# Patient Record
Sex: MALE | Race: WHITE | NOT HISPANIC OR LATINO | Employment: UNEMPLOYED | URBAN - METROPOLITAN AREA
[De-identification: names, ages, dates, MRNs, and addresses within clinical notes are randomized per-mention and may not be internally consistent; named-entity substitution may affect disease eponyms.]

---

## 2024-04-12 ENCOUNTER — HOSPITAL ENCOUNTER (OUTPATIENT)
Dept: PSYCHIATRY | Facility: HOSPITAL | Age: 24
Discharge: HOME | End: 2024-04-12
Attending: PSYCHIATRY & NEUROLOGY
Payer: COMMERCIAL

## 2024-04-12 VITALS
DIASTOLIC BLOOD PRESSURE: 57 MMHG | RESPIRATION RATE: 18 BRPM | TEMPERATURE: 97 F | HEART RATE: 88 BPM | SYSTOLIC BLOOD PRESSURE: 111 MMHG

## 2024-04-12 DIAGNOSIS — F40.10 SOCIAL ANXIETY DISORDER: ICD-10-CM

## 2024-04-12 DIAGNOSIS — F33.2 SEVERE EPISODE OF RECURRENT MAJOR DEPRESSIVE DISORDER, WITHOUT PSYCHOTIC FEATURES: Primary | ICD-10-CM

## 2024-04-12 DIAGNOSIS — F41.1 GAD (GENERALIZED ANXIETY DISORDER): ICD-10-CM

## 2024-04-12 PROCEDURE — 90853 GROUP PSYCHOTHERAPY: CPT

## 2024-04-12 PROCEDURE — 90792 PSYCH DIAG EVAL W/MED SRVCS: CPT | Mod: ,,, | Performed by: PSYCHIATRY & NEUROLOGY

## 2024-04-12 RX ORDER — ESCITALOPRAM OXALATE 5 MG/1
5 TABLET ORAL NIGHTLY
Qty: 30 TABLET | Refills: 0 | Status: SHIPPED | OUTPATIENT
Start: 2024-04-12 | End: 2024-06-11
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
## 2024-04-12 NOTE — PROGRESS NOTES
Group Psychotherapy (PhD/LCSW)    Site: [facility]    Clinical status of patient: Intensive Outpatient Program (IOP)    Date: 4/12/2024    Group Focus: Psychodynamic Group Psychotherapy    Length of service: 95652 - 45-50 minutes    Number of patients in attendance: 7    Referred by: [facility]     Target symptoms: Mood Disorder    Patient's response to treatment: Active Listening, Self-disclosure, Frequent Questions, and Feedback given to another patient    Progress toward goals: Progressing adequately    Interval History: Patient introduced themselves to the group and encouraged the other members who were making contributions to continue to try and utilize their newly learned skills. Patient validated the comments of other group members and offered insights and shared experiences of their own. Patient participated in group discussion of destigmatizing sleeping in separate beds for the health of the relationship, and the challenges of communicating that need to a partner.     Diagnosis:     ICD-10-CM ICD-9-CM   1. Severe episode of recurrent major depressive disorder, without psychotic features  F33.2 296.33       Plan: Continue treatment on [facility]

## 2024-04-12 NOTE — PLAN OF CARE
04/12/24 1508   Activity/Group Therapy Checklist   Group Meditation/Relaxation   Attendance Attended   Follows Direction Followed directions   Group Interactions/Observations Interacted appropriately;Sharing;Supportive;Alert   Affect/Mood Range Normal range   Affect/Mood Display Appropriate   Goal Progression Progressing

## 2024-04-12 NOTE — TREATMENT PLAN
"[facility]  MENTAL WELLNESS PROGRAM  INTERDISCIPLINARY TREATMENT PLAN  INTENSIVE OUTPATIENT     INTERDISCIPLINARY  TREATMENT TEAM:    Jonathan Nash M.D., Psychiatrist    Chata Pacheco M.D., Psychiatrist      Luna Alvarado, Ph.D., Clinical Psychologist    Saad Augustine LPN, Licensed Practical Nurse    Loreto Wilkins, GLORIASW, Licensed Master of Social Work     MELIA Hand, Registered     Resident: Romain Overton MD    (Signatures scanned into record separately).      ESTIMATED LOS:  2 weeks    The patient has reviewed the treatment plan with staff and has signed the "Patient Responsibilities" form.    (Patient signature scanned into record separately).       TREATMENT PLAN    DIAGNOSIS:  MDD, recurrent, severe  SANGEETHA  Social Anxiety Disorder  R/o ASD    Patient Education Needs/Barriers to Learning (i.e., Language, Reading, Comprehension): None     Support/Advocacy Services/Needs (i.e., Financial, Transportation, Medications): None     Community Resources (i.e., Alcoholics Anonymous, Al Anon): None     Patients Identified Goals:  Have a better tool set to handle and process my emotions   Change behaviors I find self-harmful     Coping Skills:  Aggressive hobbying, shift focus to enjoyment whether priority or not   Self medication       Strengths:  When given a task, I will complete it, no excuses   Intelligent     Limitations:  Bad emotional identification/handling methods   Despair, feeling that I am unworth the effort in changing     Goals and Objectives:  1. Goal: Attend and participate in all groups   Objective measure: Progress notes indicating active listening,    self-disclosure, feedback   Time frame to reach goal: Each day    2. Goal: Medication management   Objective measure: Physician progress note indicating improved medication status   Time frame to reach goal: By discharge    3. Goal: Reduce depression   Objective measure: Physician progress note indicating " depression is improved   Time frame to reach goal: By discharge    4.  Goal: Reduce anxiety   Objective measure: Physician progress note indicating anxiety is improved   Time frame to reach goal: By discharge    5. Goal: Develop stress coping skills   Objective measure: Patient self-report of improved coping   Time frame to reach goal: By discharge      Group Interventions:    Psychodynamic Group Psychotherapy - 1 hour, 5 times per week  Goals: 1. Utilize group empathy and support for problem solving; 2. Apply stress management, communication, and assertiveness skills to personal issues; 3. Discuss mental health symptoms and explore strategies for coping; 4. Discuss ways to change lifestyle to maintain better emotional health.    Communication Skills - 1 hour, 2 times per week  Goals: 1. Learn rules of effective communication; 2. Improve listening skills; 3. Practice clear communication.    Nutrition and Health - 1 hour, 1 time per week  Goals: 1. Explore impact that nutrition has on health; 2. Identify positive nutritional choices; 3. Explore how nutrition relates to stress tolerance.    Personal Growth - 1 hour, 2 times per week  Goals: 1. Discuss the development of self; 2. Create personal awareness and insight; 3. Explore a variety of psycho-educational techniques.    Promoting Healthy Lifestyles - 1 hour, 1 time per week  Goals: 1. Understand the Biopsychosocial Model of Health; 2. Develop insight into how mental health can impact other dimensions of health; 3. Develop appropriate health promotion strategies.    Acceptance and Commitment Therapy (ACT)- 1 hour, 1 time per week  Goals: 1. Learn an action-oriented psychotherapy called ACT; 2. Focus on identifying, challenging, and clarifying values systems and beliefs; 3. Explore how values oriented actions can lead to emotional well-being.    Relationship Dynamics - 1 hour, 1 time per week  Goals: 1. Learn about factors that shape relationships; 2. Understand the  central role of relationships in personal well-being; 3. Learn how to improve all relationships.    Relaxation Training - 1 hour, 3 times per week  Goals: 1. Learn about and implement various techniques for releasing physical tension from the body.    Spirituality - 1 hour, 1 time per week  Goals: 1. Discuss and reflect on the process of seeking peace and comfort; 2. Identify healthy ways of exploring spirituality.    Stress Management - 1 hour, 4 times per week  Goals: 1. Identify types and levels of stress; 2. Identify and change maladaptive beliefs and behaviors; 3. Identify and practice techniques of stress management.    DBT- 1 hour, 4 times per week  Goals: 1. Discuss emotion regulation and Interpersonal Effectiveness Skills and practice mindfulness; 2. Identify and change maladaptive beliefs and behaviors; Identify and practice techniques of DBT and mindfulness.

## 2024-04-12 NOTE — PLAN OF CARE
"   04/12/24 1532   Activity/Group Therapy Checklist   Group Other (Comments)  (Creative Therapy)   Attendance Attended   Follows Direction Followed directions   Group Interactions/Observations Interacted appropriately;Alert;Sharing;Supportive   Affect/Mood Range Normal range   Affect/Mood Display Appropriate   Goal Progression Progressing      facilitated creative session and dicussed with patients how creative therapy can be therapeutic to resolving anxiety and other stress-related issues. SW discussed activity: " Mask On, Mask Off". Patient's were able to draw and/ or color two blank masks and depict two versions of self. 1) Of how others perceive them and 2) of how they perceive themselves. SW discussed with pts how this was a form of self expression and an opportunity to use this creative as a way to have difficult conversations.   "

## 2024-04-15 ENCOUNTER — HOSPITAL ENCOUNTER (OUTPATIENT)
Dept: PSYCHIATRY | Facility: HOSPITAL | Age: 24
Discharge: HOME | End: 2024-04-15
Attending: STUDENT IN AN ORGANIZED HEALTH CARE EDUCATION/TRAINING PROGRAM
Payer: COMMERCIAL

## 2024-04-15 DIAGNOSIS — F33.2 SEVERE EPISODE OF RECURRENT MAJOR DEPRESSIVE DISORDER, WITHOUT PSYCHOTIC FEATURES: Primary | ICD-10-CM

## 2024-04-15 PROCEDURE — 90853 GROUP PSYCHOTHERAPY: CPT

## 2024-04-15 PROCEDURE — 99232 SBSQ HOSP IP/OBS MODERATE 35: CPT | Mod: GT,,, | Performed by: STUDENT IN AN ORGANIZED HEALTH CARE EDUCATION/TRAINING PROGRAM

## 2024-04-15 NOTE — PLAN OF CARE
04/15/24 1515   Activity/Group Therapy Checklist   Group Other (Comments)  (Processing)   Attendance Attended   Follows Direction Followed directions   Group Interactions/Observations Interacted appropriately;Sharing;Supportive;Alert   Affect/Mood Range Normal range   Affect/Mood Display Appropriate   Goal Progression Progressing

## 2024-04-15 NOTE — PROGRESS NOTES
Group Psychotherapy (PhD/LCSW)    Site: [facility]    Clinical status of patient: Intensive Outpatient Program (IOP)    Date: 4/15/2024    Group Focus: Distress Tolerance    Length of service: 55091 - 45-50 minutes    Number of patients in attendance: 10    Referred by: [facility]     Target symptoms: Mood Disorder    Patient's response to treatment: Active Listening, and Self-disclosure,    Progress toward goals: Progressing adequately    Interval History: Session focus was Distress Tolerance:  TIP skill.  Patients were encouraged to use temperature, intense exercise, paced breathing, or paired muscle relaxation to reduce intensity of distress.     Diagnosis:     ICD-10-CM ICD-9-CM   1. Severe episode of recurrent major depressive disorder, without psychotic features  F33.2 296.33       Plan: Continue treatment on [facility]

## 2024-04-15 NOTE — PROGRESS NOTES
"[facility] PROGRESS NOTE    PATIENT NAME: Shabbir Alicea  MRN: [id number]  ENCOUNTER DATE:  4/15/2024 9:36 AM   SITE:  U unit, [facility]  ADMIT DATE:   Pt Name: Shabbir Alicea   : 2000   MRN: [id number]      HISTORY OF PRESENTING ILLNESS:  Shabbir Alicea is a 24 y.o. male with history of no prior psychiatric diagnosis who is admitted to BMU for worsening depression and anxiety.     Pt describes lots of stressors, including in relationship. Very poor coping skills. Girlfriend has serious mental illness. Mom is a stressor also.  He was rx'd lexapro 5 mg but he did not pick it up. He is open to starting a medication.   Pt lives with girlfriend. 1 year anniversary with girlfriend. Overall he feels they have an OK relationship.   He would like to work on his coping skills  No SI, HI, AVH.       Risk Parameters:  Patient reports no suicidal ideation  Patient reports no homicidal ideation  Patient reports no self-injurious behavior  Patient reports no violent behavior    Current psych meds  Lexapro 5 mg        PAST PSYCHIATRIC, MEDICAL, AND SOCIAL HISTORY REVIEWED  The patient's past medical, family and social history have been reviewed and updated as appropriate within the electronic medical record     MEDICAL REVIEW OF SYSTEMS  History obtained from the patient  General : NO chills or fever  Respiratory: NO cough, shortness of breath  Cardiovascular: NO chest pain, palpitations or racing heart  Gastrointestinal: NO nausea, vomiting, constipation or diarrhea  Neurological: NO confusion, dizziness, headaches or tremors  Psychiatric: please see HPI     ALL MEDICATIONS:    Current Outpatient Medications:     EScitalopram oxalate (LEXAPRO) 5 MG Tab, Take 1 tablet (5 mg total) by mouth nightly., Disp: 30 tablet, Rfl: 0    ALLERGIES:  Review of patient's allergies indicates:  Not on File    RELEVANT LABS/STUDIES:    No results found for: "WBC", "HGB", "HCT", "MCV", "PLT"  BMP  No " "results found for: "NA", "K", "CL", "CO2", "BUN", "CREATININE", "CALCIUM", "ANIONGAP", "ESTGFRAFRICA", "EGFRNONAA"  No results found for: "ALT", "AST", "GGT", "ALKPHOS", "BILITOT"  No results found for: "TSH"  No results found for: "LABA1C", "HGBA1C"    VITALS  defer to nursing note    PHYSICAL EXAM  General: well developed, well nourished  Neurologic:   Gait: Normal   Psychomotor signs:  No involuntary movements or tremor  AIMS: none    PSYCHIATRIC EXAM:  Mental Status Exam:  General Appearance: appears stated age, well developed and nourished, adequately groomed and appropriately dressed, in no acute distress  Behavior: cooperative, friendly, pleasant, polite, poor eye contact  Involuntary Movements and Motor Activity: no abnormal involuntary movements noted; no tics, no tremors, no akathisia, no dystonia, no evidence of tardive dyskinesia; no psychomotor agitation or retardation  Gait and Station: intact, normal gait and station, ambulates without assistance  Speech and Language: normal rate, rhythm, volume, tone, and pitch  Mood: "anxious"  Affect: reactive, mood-congruent, anxious  Thought Process and Associations: intact; linear, goal-directed, organized, and logical; no loosening of associations noted  Thought Content and Perceptions:: no suicidal or homicidal ideation, no auditory or visual hallucinations, no paranoid ideation, no ideas of reference, no evidence of delusions or psychosis  Sensorium and Orientation: intact; alert with clear sensorium; oriented fully to person, place, time and situation  Recent and Remote Memory: grossly intact, able to recall relevant and salient information from the recent and remote past  Attention and Concentration: grossly intact, attentive to the conversation and not readily distractible  Fund of Knowledge: grossly intact, used appropriate vocabulary and demonstrated an awareness of current events, consistent with educational level achieved  Insight: intact, demonstrates " awareness of illness and situation  Judgment: intact, behavior is adequate/appropriate to the circumstances, compliant with health provider's recommendations and instructions      IMPRESSION:    Shabbir Alicea is a 24 y.o. male with history of MDD, recurent, severe, SANGEETHA, SAD, r/o ASD who is admitted to BMU for treatment.     Status/Progress:  Based on the examination today, the patient's problem(s) is/are inadequately controlled.  New problems have not been presented today.     DIAGNOSES:  MDD, recurrent, severe  SANGEETHA  Social Anxiety Disorder  R/o ASD    PLAN:    1) Continue BMU program with group and individual therapies.     2) Psych Med:  Start lexapro 5 mg. Reviewed r/b/SE with the patient. Advised him to pick the medication up and get labwork- he did not do this yet.     Discussed with patient informed consent, risks vs. benefits, alternative treatments, side effect profile and the inherent unpredictability of individual responses to these treatments. Answered any questions patient may have had. The patient expresses understanding of the above and displays the capacity to agree with this current plan     3) Other: Labs/medical problems/ collateral- none needed

## 2024-04-16 ENCOUNTER — HOSPITAL ENCOUNTER (OUTPATIENT)
Dept: PSYCHIATRY | Facility: HOSPITAL | Age: 24
Discharge: HOME | End: 2024-04-16
Attending: STUDENT IN AN ORGANIZED HEALTH CARE EDUCATION/TRAINING PROGRAM
Payer: COMMERCIAL

## 2024-04-16 ENCOUNTER — LAB VISIT (OUTPATIENT)
Dept: LAB | Facility: HOSPITAL | Age: 24
End: 2024-04-16
Payer: COMMERCIAL

## 2024-04-16 DIAGNOSIS — F33.2 SEVERE EPISODE OF RECURRENT MAJOR DEPRESSIVE DISORDER, WITHOUT PSYCHOTIC FEATURES: ICD-10-CM

## 2024-04-16 DIAGNOSIS — F33.2 SEVERE EPISODE OF RECURRENT MAJOR DEPRESSIVE DISORDER, WITHOUT PSYCHOTIC FEATURES: Primary | ICD-10-CM

## 2024-04-16 LAB
ALBUMIN SERPL BCP-MCNC: 3.9 G/DL (ref 3.5–5.2)
ALP SERPL-CCNC: 66 U/L (ref 55–135)
ALT SERPL W/O P-5'-P-CCNC: 13 U/L (ref 10–44)
ANION GAP SERPL CALC-SCNC: 8 MMOL/L (ref 8–16)
AST SERPL-CCNC: 15 U/L (ref 10–40)
BASOPHILS # BLD AUTO: 0.05 K/UL (ref 0–0.2)
BASOPHILS NFR BLD: 0.8 % (ref 0–1.9)
BILIRUB SERPL-MCNC: 0.3 MG/DL (ref 0.1–1)
BUN SERPL-MCNC: 12 MG/DL (ref 6–20)
CALCIUM SERPL-MCNC: 9.3 MG/DL (ref 8.7–10.5)
CHLORIDE SERPL-SCNC: 103 MMOL/L (ref 95–110)
CO2 SERPL-SCNC: 31 MMOL/L (ref 23–29)
CREAT SERPL-MCNC: 0.8 MG/DL (ref 0.5–1.4)
DIFFERENTIAL METHOD BLD: ABNORMAL
EOSINOPHIL # BLD AUTO: 0.4 K/UL (ref 0–0.5)
EOSINOPHIL NFR BLD: 6.8 % (ref 0–8)
ERYTHROCYTE [DISTWIDTH] IN BLOOD BY AUTOMATED COUNT: 13.3 % (ref 11.5–14.5)
EST. GFR  (NO RACE VARIABLE): >60 ML/MIN/1.73 M^2
GLUCOSE SERPL-MCNC: 92 MG/DL (ref 70–110)
HCT VFR BLD AUTO: 40.7 % (ref 40–54)
HGB BLD-MCNC: 13.4 G/DL (ref 14–18)
IMM GRANULOCYTES # BLD AUTO: 0.01 K/UL (ref 0–0.04)
IMM GRANULOCYTES NFR BLD AUTO: 0.2 % (ref 0–0.5)
LYMPHOCYTES # BLD AUTO: 1.5 K/UL (ref 1–4.8)
LYMPHOCYTES NFR BLD: 23.9 % (ref 18–48)
MCH RBC QN AUTO: 30.5 PG (ref 27–31)
MCHC RBC AUTO-ENTMCNC: 32.9 G/DL (ref 32–36)
MCV RBC AUTO: 93 FL (ref 82–98)
MONOCYTES # BLD AUTO: 0.6 K/UL (ref 0.3–1)
MONOCYTES NFR BLD: 9 % (ref 4–15)
NEUTROPHILS # BLD AUTO: 3.8 K/UL (ref 1.8–7.7)
NEUTROPHILS NFR BLD: 59.3 % (ref 38–73)
NRBC BLD-RTO: 0 /100 WBC
PLATELET # BLD AUTO: 254 K/UL (ref 150–450)
PMV BLD AUTO: 9.9 FL (ref 9.2–12.9)
POTASSIUM SERPL-SCNC: 4 MMOL/L (ref 3.5–5.1)
PROT SERPL-MCNC: 6.9 G/DL (ref 6–8.4)
RBC # BLD AUTO: 4.39 M/UL (ref 4.6–6.2)
SODIUM SERPL-SCNC: 142 MMOL/L (ref 136–145)
TSH SERPL DL<=0.005 MIU/L-ACNC: 0.66 UIU/ML (ref 0.4–4)
WBC # BLD AUTO: 6.44 K/UL (ref 3.9–12.7)

## 2024-04-16 PROCEDURE — 36415 COLL VENOUS BLD VENIPUNCTURE: CPT | Performed by: STUDENT IN AN ORGANIZED HEALTH CARE EDUCATION/TRAINING PROGRAM

## 2024-04-16 PROCEDURE — 90853 GROUP PSYCHOTHERAPY: CPT | Mod: ,,, | Performed by: PSYCHOLOGIST

## 2024-04-16 PROCEDURE — 80053 COMPREHEN METABOLIC PANEL: CPT | Performed by: STUDENT IN AN ORGANIZED HEALTH CARE EDUCATION/TRAINING PROGRAM

## 2024-04-16 PROCEDURE — 99232 SBSQ HOSP IP/OBS MODERATE 35: CPT | Mod: ,,, | Performed by: PSYCHIATRY & NEUROLOGY

## 2024-04-16 PROCEDURE — 85025 COMPLETE CBC W/AUTO DIFF WBC: CPT | Performed by: STUDENT IN AN ORGANIZED HEALTH CARE EDUCATION/TRAINING PROGRAM

## 2024-04-16 PROCEDURE — 90853 GROUP PSYCHOTHERAPY: CPT

## 2024-04-16 PROCEDURE — 84443 ASSAY THYROID STIM HORMONE: CPT | Performed by: STUDENT IN AN ORGANIZED HEALTH CARE EDUCATION/TRAINING PROGRAM

## 2024-04-16 NOTE — PROGRESS NOTES
"[facility] PROGRESS NOTE    PATIENT NAME: Shabbir Alicea  MRN: [id number]  ENCOUNTER DATE:  2024 9:36 AM   SITE:  U unit, [facility]  ADMIT DATE:   Pt Name: Shabbir Alicea   : 2000   MRN: [id number]      HISTORY OF PRESENTING ILLNESS:  Shabbir Alicea is a 24 y.o. male with history of no prior psychiatric diagnosis who is admitted to BMU for worsening depression and anxiety.     Pt states program is going great and feeling very hopeful with all the coping mechanisms. Feels that boundary setting skills likely to be most helpful. Started Lexapro yesterday and denies any side effects. Sleep and appetite are "OK". Denies SI, NSSIB, HI. No s/sx diane and/or psychosis.       Risk Parameters:  Patient reports no suicidal ideation  Patient reports no homicidal ideation  Patient reports no self-injurious behavior  Patient reports no violent behavior    Current psych meds  Lexapro 5 mg        PAST PSYCHIATRIC, MEDICAL, AND SOCIAL HISTORY REVIEWED  The patient's past medical, family and social history have been reviewed and updated as appropriate within the electronic medical record     MEDICAL REVIEW OF SYSTEMS  History obtained from the patient  General : NO chills or fever  Respiratory: NO cough, shortness of breath  Cardiovascular: NO chest pain, palpitations or racing heart  Gastrointestinal: NO nausea, vomiting, constipation or diarrhea  Neurological: NO confusion, dizziness, headaches or tremors  Psychiatric: please see HPI     ALL MEDICATIONS:    Current Outpatient Medications:     EScitalopram oxalate (LEXAPRO) 5 MG Tab, Take 1 tablet (5 mg total) by mouth nightly., Disp: 30 tablet, Rfl: 0    ALLERGIES:  Review of patient's allergies indicates:  Not on File    RELEVANT LABS/STUDIES:    No results found for: "WBC", "HGB", "HCT", "MCV", "PLT"  BMP  No results found for: "NA", "K", "CL", "CO2", "BUN", "CREATININE", "CALCIUM", "ANIONGAP", "ESTGFRAFRICA", "EGFRNONAA"  No results " "found for: "ALT", "AST", "GGT", "ALKPHOS", "BILITOT"  No results found for: "TSH"  No results found for: "LABA1C", "HGBA1C"    VITALS  defer to nursing note    PHYSICAL EXAM  General: well developed, well nourished  Neurologic:   Gait: Normal   Psychomotor signs:  No involuntary movements or tremor  AIMS: none    PSYCHIATRIC EXAM:  Mental Status Exam:  General Appearance: appears stated age, well developed and nourished, adequately groomed and appropriately dressed, in no acute distress  Behavior: cooperative, friendly, pleasant, polite, poor eye contact  Involuntary Movements and Motor Activity: no abnormal involuntary movements noted; no tics, no tremors, no akathisia, no dystonia, no evidence of tardive dyskinesia; no psychomotor agitation or retardation  Gait and Station: intact, normal gait and station, ambulates without assistance  Speech and Language: normal rate, rhythm, volume, tone, and pitch  Mood: "good"  Affect: reactive, mood-congruent, anxious  Thought Process and Associations: intact; linear, goal-directed, organized, and logical; no loosening of associations noted  Thought Content and Perceptions:: no suicidal or homicidal ideation, no auditory or visual hallucinations, no paranoid ideation, no ideas of reference, no evidence of delusions or psychosis  Sensorium and Orientation: intact; alert with clear sensorium; oriented fully to person, place, time and situation  Recent and Remote Memory: grossly intact, able to recall relevant and salient information from the recent and remote past  Attention and Concentration: grossly intact, attentive to the conversation and not readily distractible  Fund of Knowledge: grossly intact, used appropriate vocabulary and demonstrated an awareness of current events, consistent with educational level achieved  Insight: intact, demonstrates awareness of illness and situation  Judgment: intact, behavior is adequate/appropriate to the circumstances, compliant with " health provider's recommendations and instructions      IMPRESSION:    Shabbir Alicea is a 24 y.o. male with history of MDD, recurent, severe, SANGEETHA, SAD, r/o ASD who is admitted to BMU for treatment.     Status/Progress:  Based on the examination today, the patient's problem(s) is/are inadequately controlled.  New problems have not been presented today.     DIAGNOSES:  MDD, recurrent, severe  SANGEETHA  Social Anxiety Disorder  R/o ASD    PLAN:    1) Continue BMU program with group and individual therapies.     2) Psych Med:  Continue lexapro 5 mg nightly    Discussed with patient informed consent, risks vs. benefits, alternative treatments, side effect profile and the inherent unpredictability of individual responses to these treatments. Answered any questions patient may have had. The patient expresses understanding of the above and displays the capacity to agree with this current plan     3) Other: Labs/medical problems/ collateral- labs in process       Romain Overton MD  [facility]  04/16/2024 3:18 PM

## 2024-04-16 NOTE — PROGRESS NOTES
Group Psychotherapy (PhD/LCSW)    Site: [facility]    Clinical status of patient: Intensive Outpatient Program (IOP)    Date: 4/15/2024    Group Focus: Sleep 101    Length of service: 93335 - 45-50 minutes    Number of patients in attendance: 10    Referred by: [facility]     Target symptoms: Mood Disorder    Patient's response to treatment: Active Listening, Self-disclosure, Frequent Questions, and Feedback given to another patient    Progress toward goals: Progressing adequately    Interval History: STIMULUS CONTROL and SLEEP COMPRESSION   Session focus was on stimulus control and sleep compression. Clinician and patient discussed associating beds with wakefulness and how to disrupt the connection. Clinician also discussed the use of sleep compression to improve sleep quality and stimulate sleep drive. Patients reviewed factors contributing to sleep hygiene.  Patients reviewed sleep diaries and strategies for implementing stimulus control and sleep compression.    Diagnosis:     ICD-10-CM ICD-9-CM   1. Severe episode of recurrent major depressive disorder, without psychotic features  F33.2 296.33       Plan: Continue treatment on [facility]

## 2024-04-16 NOTE — PROGRESS NOTES
Group Psychotherapy (PhD/LCSW)    Site: [facility]    Clinical status of patient: Intensive Outpatient Program (IOP)    Date: 4/16/2024    Group Focus: Psychodynamic Processing     Length of service: 99018 - 45-50 minutes    Number of patients in attendance: 9    Referred by: [facility]     Target symptoms: Mood Disorder    Patient's response to treatment: Active Listening, and Self-disclosure,    Progress toward goals: Progressing adequately    Interval History: Patients introduced themselves to new group members. They also shared their experiences in the program, along with skills learned. The group was focused on self-care and learning that personal needs matter. This patient reported that he was feeling more hopeful, and has started to notice heathy changes. He remained engaged throughout the session.     Diagnosis:     ICD-10-CM ICD-9-CM   1. Severe episode of recurrent major depressive disorder, without psychotic features  F33.2 296.33       Plan: Continue treatment in [facility] program

## 2024-04-16 NOTE — PROGRESS NOTES
Group Psychotherapy (PhD/LCSW)    Site: [facility]    Clinical status of patient: Intensive Outpatient Program (IOP)    Date: 4/16/2024    Group Focus: Interpersonal Effectiveness    Length of service: 25660 - 45-50 minutes    Number of patients in attendance: 13    Referred by: [facility]     Target symptoms: Mood Disorder    Patient's response to treatment: Active Listening, and Self-disclosure,    Progress toward goals: Progressing adequately    Interval History: Session focus was Interpersonal Effectiveness: Recovering from Invalidation. Patient's identified types of invalidation and circumstances when invalidation was helpful. Patient's reviewed strategies for recovering from invalidation.     Diagnosis:     ICD-10-CM ICD-9-CM   1. Severe episode of recurrent major depressive disorder, without psychotic features  F33.2 296.33       Plan: Continue treatment on [facility]

## 2024-04-17 ENCOUNTER — HOSPITAL ENCOUNTER (OUTPATIENT)
Dept: PSYCHIATRY | Facility: HOSPITAL | Age: 24
Discharge: HOME | End: 2024-04-17
Attending: STUDENT IN AN ORGANIZED HEALTH CARE EDUCATION/TRAINING PROGRAM
Payer: COMMERCIAL

## 2024-04-17 DIAGNOSIS — F33.2 SEVERE EPISODE OF RECURRENT MAJOR DEPRESSIVE DISORDER, WITHOUT PSYCHOTIC FEATURES: Primary | ICD-10-CM

## 2024-04-17 PROCEDURE — 90853 GROUP PSYCHOTHERAPY: CPT | Mod: ,,, | Performed by: PSYCHOLOGIST

## 2024-04-17 PROCEDURE — 90853 GROUP PSYCHOTHERAPY: CPT | Mod: ,,,

## 2024-04-17 NOTE — PATIENT CARE CONFERENCE
"Presenting Problem and History:    Shabbir Alicea is a 24 y.o. male with history of no prior psychiatric diagnosis who is admitted to BMU for worsening depression and anxiety.      Pt reports excessive anxiety and worrying, occurring more days than not, for >10 years. Pt reports worrying about multiple issues. Worry has been difficult to control and associated with the following symptoms: restlessness, being easily fatigued, difficulty concentrating, irritability, muscle tension, and sleep disturbances. Pt reports clinically significant distress / impairment in multiple settings (social, occupational, other important areas of functioning). Patient also complains of depression. Onset was in middle school. Has had several episodes since that time and depression "worse in times of high stress". Current symptoms have been gradually worsening for the past 1 year, and include: anhedonia, depressed mood, difficulty concentrating, feelings of worthlessness/guilt, insomnia, and decreased appetite . He also endorses occasional passive SI, including recently but not currently. He denies any active SI/plan/intent. Denies any prior SA. He denies having direct access to firearms. He is help seeking and hopes to learn healthy coping skills to help him cope with multiple stressors including: Job searching x 1 year since graduating but unable to find employment yet. Worries about well-being of his mother and his girlfriend. States his mom has been stressed with taking care of his isabella who is having declining health and mental health issues of her own. Other times, pt feels very overwhelmed and anxious when his girlfriend "is spiraling" and in emotional "pain", and pt wants to help but doesn't know how to do so. Pt also describes heightened anxiety and nervousness in social settings, fear of embarrassment and scrutiny by others, feels he lacks communication skills and has hard time picking up on non-verbal communication cues.    "     Medications:    Start Lexapro 5 mg nightly     Diagnoses:    MDD, recurrent, severe  SANGEETHA  Social Anxiety Disorder  R/o ASD    Progress:    Patient was staffed by Team. Pt initiated program on 4/12. Pt has been cooperative and appropriate during program. Pt has been supportive and offered feedback to other group members. Team will continue to monitor progress of pt during groups.      Plan of Care:    Patient will continue [facility] prgoram.     Aftercare/Follow ups:  Med Management: TBD  Psychotherapy: TBD      Staff present:  MD Jonathan Heart MD Ariana Mitchell, PhD  Shabbir Zhang, PhD  Saad Augustine, MARIA D Wilkins, CSW  Shweta Trujillo, RSW

## 2024-04-17 NOTE — PROGRESS NOTES
Group Psychotherapy (PhD/LCSW)     Site: [facility]     Clinical status of patient: Intensive Outpatient Program (IOP)     Date: 04/17/2024      Group Focus: Assertive Communication     Length of service: 50827 - 45-60 minutes     Number of patients in attendance: 11     Referred by: [facility]      Target symptoms: Depression     Patient's response to treatment: Active Listening and Self-Disclosure     Progress toward goals: Progressing adequately     Interval History: Group participants reviewed Personal Bill of Rights and explored emotions and reactions related to this information. Participants reflected on personal rights that they found difficult to believe, as well as challenges in honoring the rights of others. Participants reviewed distinctions among passive, aggressive, and assertive communication. Participants discussed strategies for fostering healthy assertive communication and explored methods to assist them in communicating assertively with others. Participants also reflected on challenges associated with assertive communication and in coping with how they are at times perceived by others.     Diagnoses:    ICD-10-CM ICD-9-CM   1. Severe episode of recurrent major depressive disorder, without psychotic features  F33.2 296.33      Plan: Continue treatment on [facility]

## 2024-04-17 NOTE — PROGRESS NOTES
Group Psychotherapy (PhD/LCSW)    Site: [facility]    Clinical status of patient: Intensive Outpatient Program (IOP)    Date: 4/17/2024    Group Focus: Mindfulness    Length of service: 65289 - 45-50 minutes    Number of patients in attendance: 12    Referred by: [facility]     Target symptoms: Mood Disorder    Patient's response to treatment: Active Listening, and Self-disclosure,    Progress toward goals: Progressing adequately    Interval History: Session focus was Mindfulness: Mindfulness [place] and Kindness Meditation. Patient's were introduced to the [place] Kindness meditation skill. Patients discussed the uses of the meditation, how to practice the meditation, and participated in a loving kindness meditation during session.     Diagnosis:     ICD-10-CM ICD-9-CM   1. Severe episode of recurrent major depressive disorder, without psychotic features  F33.2 296.33       Plan: Continue treatment on [facility]

## 2024-04-18 ENCOUNTER — HOSPITAL ENCOUNTER (OUTPATIENT)
Dept: PSYCHIATRY | Facility: HOSPITAL | Age: 24
Discharge: HOME | End: 2024-04-18
Attending: STUDENT IN AN ORGANIZED HEALTH CARE EDUCATION/TRAINING PROGRAM
Payer: COMMERCIAL

## 2024-04-18 DIAGNOSIS — F33.2 SEVERE EPISODE OF RECURRENT MAJOR DEPRESSIVE DISORDER, WITHOUT PSYCHOTIC FEATURES: Primary | ICD-10-CM

## 2024-04-18 PROCEDURE — 90853 GROUP PSYCHOTHERAPY: CPT | Mod: ,,, | Performed by: PSYCHOLOGIST

## 2024-04-18 PROCEDURE — 90853 GROUP PSYCHOTHERAPY: CPT

## 2024-04-18 NOTE — PSYCH
"[facility]  Psychosocial Assessment    Date: 2024  Time: 9:21 AM    Name: Shabbir Alicea    Age: 24 y.o.       : 2000         Race: White    Precipitating Event: adjustment issues, adult ADHD, appetite disturbance, family conflict, hopelessness/helplessness, and social isolation    Symptoms: cry often/depressed, worry alot, loss of interest in eating, anxiety/tension, and loss of energy/fatigue    Marital Status: co-habitating    Spouse/Significant Other: Pt lives with his girlfriend    Quality of Relationship: "Good"     Education: college graduate    Occupation: None     Employer/School: Recently received his bachelors from [employer] and is "looking for work"     Medical/Psychiatric History   Past Psychiatric History: none     Not currently in treatment with a therapist or psychiatrist.     Medical Conditions/including prior head injury: See past medical history    Suicidal Ideation/Homicidal Ideation:  suicidal ideation   Fleeting thoughts like "I don't want to be here". Pt has no intent or plan to act on those thoughts "I would never do anything". Used to "head butt walls as a kid to feel pain".     Current Medications:   Current Outpatient Medications:     EScitalopram oxalate (LEXAPRO) 5 MG Tab, Take 1 tablet (5 mg total) by mouth nightly., Disp: 30 tablet, Rfl: 0    Allergies: Review of patient's allergies indicates:  Not on File    Family History  Mother: Age 59  Occupation: Works as a teacher   Medical/Psychiatric History: ADHD    Father: Was 58 when he passed away   Occupation: [place], asbestos removal   Medical/Psychiatric History: Throat cancer     Siblings and present ages: None    Medical or Psychiatric Problems: N/A    Relationships with parents and siblings: Deferred     Developmental History  Place of birth: [place]    Developmental Milestones: Patient reports all met    History of Physical/Sexual Abuse: No     Childhood Behavioral Problems: "I was a " "smart ass". Pt reports having been bullied in lower school and eventually completed his schooling online to avoid having to deal with the bullies in person at school.     Children  Names/Ages: No    Medical or Psychiatric Problems: N/A    Quality of Parent/Child Relationship: N/A    Criminal History  Arrests:  No    Miscellaneous:  Financial Issues: Yes    Leisure Activities: Playing videogames, painting, drawing writing     Owns a gun? No Secured? N/A     History:  No    Comments: Pt was cooperative with  during the assessment. Asssessment was conducted with the patient in the conference room.     Discharge Plans: Will follow-up with outpatient therapist for psychotherapy, outpatient psychiatrist for medication management and after care group with Dr. lAvarado or Dr. Zhang pending availability.         "

## 2024-04-18 NOTE — PROGRESS NOTES
Group Psychotherapy (PhD/LCSW)    Site: [facility]    Clinical status of patient: Intensive Outpatient Program (IOP)    Date: 4/18/2024    Group Focus: Psychodynamic Processing     Length of service: 66785 - 45-50 minutes    Number of patients in attendance: 7    Referred by: [facility]     Target symptoms: Mood Disorder    Patient's response to treatment: Active Listening, and Self-disclosure,    Progress toward goals: Progressing adequately    Interval History: Session was focused on helping patients explore techniques to set healthy boundaries with family/friends and implement distraction-based coping skills when needed. This patient stated that he enjoys listening to music, playing computer games with others, and writing dialogues. This patient remained engaged throughout the session and he offered support to others.    Diagnosis:     ICD-10-CM ICD-9-CM   1. Severe episode of recurrent major depressive disorder, without psychotic features  F33.2 296.33       Plan: Continue treatment in [facility] program

## 2024-04-18 NOTE — PLAN OF CARE
04/18/24 1403   Activity/Group Therapy Checklist   Group Other (Comments)   Attendance Attended   Follows Direction Followed directions   Group Interactions/Observations Interacted appropriately;Alert;Sharing;Supportive   Affect/Mood Range Normal range   Affect/Mood Display Appropriate   Goal Progression Progressing      facilitated creative group session with patients.  SW discused the concept of building happiness with the Heart Map activity.  SW asked pts to identify people places, things and others that contributed to their happines and lived deep inside of their heart and to be descriptive as possible. This exercise allowed pts to reflect on siginificant memories small and big and ways to consider how to sustain happiness.

## 2024-04-18 NOTE — PROGRESS NOTES
Group Psychotherapy (PhD/LCSW)    Site: [facility]    Clinical status of patient: Intensive Outpatient Program (IOP)    Date: 4/18/2024    Group Focus: Emotion Regulation    Length of service: 92507 - 45-50 minutes    Number of patients in attendance: 9    Referred by: [facility]     Target symptoms: Mood Disorder    Patient's response to treatment: Active Listening, and Self-disclosure,    Progress toward goals: Progressing adequately    Interval History: Session focus was Emotion Regulation:  Check the Facts.  Patients were encouraged to understand what their emotions do for them (motivate them to action, communicate to themselves and others).  They were encouraged to check the facts to ensure their emotion intensity fits the situation.      Diagnosis:     ICD-10-CM ICD-9-CM   1. Severe episode of recurrent major depressive disorder, without psychotic features  F33.2 296.33       Plan: Continue treatment on [facility]

## 2024-04-19 ENCOUNTER — HOSPITAL ENCOUNTER (OUTPATIENT)
Dept: PSYCHIATRY | Facility: HOSPITAL | Age: 24
Discharge: HOME | End: 2024-04-19
Attending: STUDENT IN AN ORGANIZED HEALTH CARE EDUCATION/TRAINING PROGRAM
Payer: COMMERCIAL

## 2024-04-19 VITALS
RESPIRATION RATE: 18 BRPM | SYSTOLIC BLOOD PRESSURE: 132 MMHG | TEMPERATURE: 97 F | DIASTOLIC BLOOD PRESSURE: 69 MMHG | HEART RATE: 58 BPM

## 2024-04-19 DIAGNOSIS — F33.2 SEVERE EPISODE OF RECURRENT MAJOR DEPRESSIVE DISORDER, WITHOUT PSYCHOTIC FEATURES: Primary | ICD-10-CM

## 2024-04-19 DIAGNOSIS — F41.1 GAD (GENERALIZED ANXIETY DISORDER): ICD-10-CM

## 2024-04-19 PROCEDURE — 90853 GROUP PSYCHOTHERAPY: CPT

## 2024-04-19 PROCEDURE — 99231 SBSQ HOSP IP/OBS SF/LOW 25: CPT | Mod: ,,, | Performed by: PSYCHIATRY & NEUROLOGY

## 2024-04-19 RX ORDER — ESCITALOPRAM OXALATE 10 MG/1
10 TABLET ORAL DAILY
Qty: 30 TABLET | Refills: 0 | Status: SHIPPED | OUTPATIENT
Start: 2024-04-19 | End: 2024-06-11

## 2024-04-19 NOTE — MEDICAL/APP STUDENT
"[facility] PROGRESS NOTE    PATIENT NAME: Shabbir Alicea  MRN: [id number]  ENCOUNTER DATE:  2024 9:40 AM   SITE:  U unit, [facility]  ADMIT DATE:   Pt Name: Shabbir Alicea   : 2000   MRN: [id number]      HISTORY OF PRESENTING ILLNESS:  Shabbir Alicea is a 24 y.o. male with no history of prior psychiatric diagnosis who is admitted to BMU for worsening depression and anxiety.    Today,  Patient reports "feeling good", and is excited about the weekend and an event planned with his partner for this evening. Reports mother's financial situation is stressful to him and find it difficult not wanting "to fix the situation".    Discussed the importance of boundary setting with others and the benefits of doing so. Also discussed increasing lexapro dose from 5 mg daily to 10 mg daily. Patient overall reports program as helpful and an improvement in symptoms.     Risk Parameters:  {parameters:73414::"Patient reports no suicidal ideation","Patient reports no homicidal ideation","Patient reports no self-injurious behavior","Patient reports no violent behavior"}    Current psych meds  Lexapro 5 mg    Medication side effects:  None    Current Substance use  Alcohol : None/  Nicotine:  None  Illicit's: None  Other Rx controlled substances (Ex-opiates, stimulants etc): None      PAST PSYCHIATRIC, MEDICAL, AND SOCIAL HISTORY REVIEWED  The patient's past medical, family and social history have been reviewed and updated as appropriate within the electronic medical record     MEDICAL REVIEW OF SYSTEMS  History obtained from the patient  General : NO chills or fever  Respiratory: NO cough, shortness of breath  Cardiovascular: NO chest pain, palpitations or racing heart  Gastrointestinal: NO nausea, vomiting, constipation or diarrhea  Neurological: NO confusion, dizziness, headaches or tremors  Psychiatric: please see HPI    ALL MEDICATIONS:    Current Outpatient Medications:     EScitalopram " "oxalate (LEXAPRO) 5 MG Tab, Take 1 tablet (5 mg total) by mouth nightly., Disp: 30 tablet, Rfl: 0    ALLERGIES:  Review of patient's allergies indicates:  Not on File    RELEVANT LABS/STUDIES:    Lab Results   Component Value Date    WBC 6.44 04/16/2024    HGB 13.4 (L) 04/16/2024    HCT 40.7 04/16/2024    MCV 93 04/16/2024     04/16/2024     BMP  Lab Results   Component Value Date     04/16/2024    K 4.0 04/16/2024     04/16/2024    CO2 31 (H) 04/16/2024    BUN 12 04/16/2024    CREATININE 0.8 04/16/2024    CALCIUM 9.3 04/16/2024    ANIONGAP 8 04/16/2024     Lab Results   Component Value Date    ALT 13 04/16/2024    AST 15 04/16/2024    ALKPHOS 66 04/16/2024    BILITOT 0.3 04/16/2024     Lab Results   Component Value Date    TSH 0.657 04/16/2024     No results found for: "LABA1C", "HGBA1C"    VITALS  defer to nursing note    PHYSICAL EXAM  General: well developed, well nourished  Neurologic:   Gait: Normal   Psychomotor signs:  No involuntary movements or tremor  AIMS: none    PSYCHIATRIC EXAM:     Mental Status Exam:  Arousal: alert  Sensorium/Orientation: oriented to grossly intact  Behavior/Cooperation: normal   Speech: {findings; speech psych:48445::"normal tone","normal rate","normal pitch","normal volume"}  Language: {EXAM; AMB PSYCH LANGUAGE:20234::"grossly intact"}  Mood: " Good "   Affect: appropriate  Thought Process: {thought process:46202::"normal and logical"}  Thought Content:   Auditory hallucinations: NO  Visual hallucinations: NO  Paranoia: NO  Delusions:  NO  Suicidal ideation: NO  Homicidal ideation: NO  Attention/Concentration:  intact  Memory:    Recent:  Intact   Remote: Intact   3/3 immediate  Fund of Knowledge: Aware of current events   Insight: intact  Judgment: behavior is adequate to circumstances       IMPRESSION:    Shabbir Alicea is a 24 y.o. male with no psychiatric history who is admitted to BMU for worsening depression and anxiety.    Status/Progress:  Based on " the examination today, the patient's problem(s) is/are improved.  New problems have not been presented today.     DIAGNOSES:  No diagnosis found.    PLAN:    1) Continue BMU program with group and individual therapies.     2) Psych Med:  Stop Lexapro 5 mg daily and Start lexapro 10 mg daily tonight     Discussed with patient informed consent, risks vs. benefits, alternative treatments, side effect profile and the inherent unpredictability of individual responses to these treatments. Answered any questions patient may have had. The patient expresses understanding of the above and displays the capacity to agree with this current plan     3) Other: Labs/medical problems/ collateral- none needed        Venkat Archer  4/19/2024 9:40 AM

## 2024-04-19 NOTE — PROGRESS NOTES
"[facility] PROGRESS NOTE    PATIENT NAME: Shabbir Alicea  MRN: [id number]  ENCOUNTER DATE:  2024 9:40 AM   SITE:  U unit, [facility]  ADMIT DATE:   Pt Name: Shabbir Alicea   : 2000   MRN: [id number]      HISTORY OF PRESENTING ILLNESS:  Shabbir lAicea is a 24 y.o. male with no history of prior psychiatric diagnosis who is admitted to BMU for worsening depression and anxiety.    Today,  Patient reports "feeling good", and is excited about the weekend and an event planned with his partner for this evening. Reports mother's financial situation is stressful to him and find it difficult not wanting "to fix the situation". Feels program helping him to develop coping strategies and boundary setting skills. Has been taking Lexapro 5 mg nightly as prescribed and denies any medication side effects.      Risk Parameters:  Patient reports no suicidal ideation  Patient reports no homicidal ideation  Patient reports no self-injurious behavior  Patient reports no violent behavior    Current psych meds  Lexapro 5 mg    Medication side effects:  None    Current Substance use  Alcohol : None/  Nicotine:  None  Illicit's: None  Other Rx controlled substances (Ex-opiates, stimulants etc): None      PAST PSYCHIATRIC, MEDICAL, AND SOCIAL HISTORY REVIEWED  The patient's past medical, family and social history have been reviewed and updated as appropriate within the electronic medical record     MEDICAL REVIEW OF SYSTEMS  History obtained from the patient  General : NO chills or fever  Respiratory: NO cough, shortness of breath  Cardiovascular: NO chest pain, palpitations or racing heart  Gastrointestinal: NO nausea, vomiting, constipation or diarrhea  Neurological: NO confusion, dizziness, headaches or tremors  Psychiatric: please see HPI    ALL MEDICATIONS:    Current Outpatient Medications:     EScitalopram oxalate (LEXAPRO) 10 MG tablet, Take 1 tablet (10 mg total) by mouth once daily., " "Disp: 30 tablet, Rfl: 0    EScitalopram oxalate (LEXAPRO) 5 MG Tab, Take 1 tablet (5 mg total) by mouth nightly., Disp: 30 tablet, Rfl: 0    ALLERGIES:  Review of patient's allergies indicates:  Not on File    RELEVANT LABS/STUDIES:    Lab Results   Component Value Date    WBC 6.44 04/16/2024    HGB 13.4 (L) 04/16/2024    HCT 40.7 04/16/2024    MCV 93 04/16/2024     04/16/2024     BMP  Lab Results   Component Value Date     04/16/2024    K 4.0 04/16/2024     04/16/2024    CO2 31 (H) 04/16/2024    BUN 12 04/16/2024    CREATININE 0.8 04/16/2024    CALCIUM 9.3 04/16/2024    ANIONGAP 8 04/16/2024     Lab Results   Component Value Date    ALT 13 04/16/2024    AST 15 04/16/2024    ALKPHOS 66 04/16/2024    BILITOT 0.3 04/16/2024     Lab Results   Component Value Date    TSH 0.657 04/16/2024     No results found for: "LABA1C", "HGBA1C"    VITALS  defer to nursing note    PHYSICAL EXAM  General: well developed, well nourished  Neurologic:   Gait: Normal   Psychomotor signs:  No involuntary movements or tremor  AIMS: none    PSYCHIATRIC EXAM:     Mental Status Exam:  Arousal: alert  Sensorium/Orientation: oriented to grossly intact  Behavior/Cooperation: normal   Speech: normal tone, normal rate, normal pitch, normal volume  Language: grossly intact, able to name, able to repeat  Mood: " Good "   Affect: appropriate, mood congruent  Thought Process: normal and logical  Thought Content:   Auditory hallucinations: NO  Visual hallucinations: NO  Paranoia: NO  Delusions:  NO  Suicidal ideation: NO  Homicidal ideation: NO  Attention/Concentration:  intact  Memory:    Recent:  Intact   Remote: Intact  Fund of Knowledge: Aware of current events   Insight: intact, has awareness of illness  Judgment: behavior is adequate to circumstances       IMPRESSION:    Shabbir lAicea is a 24 y.o. male with no psychiatric history who is admitted to BMU for worsening depression and anxiety.    Status/Progress:  Based on the " examination today, the patient's problem(s) is/are  improving .  New problems have not been presented today.     DIAGNOSES:  MDD, recurrent, severe  SANGEETHA  Social Anxiety Disorder  R/o ASD    PLAN:    1) Continue BMU program with group and individual therapies.     2) Psych Med:  Increase Lexapro to 10 mg nightly     Discussed with patient informed consent, risks vs. benefits, alternative treatments, side effect profile and the inherent unpredictability of individual responses to these treatments. Answered any questions patient may have had. The patient expresses understanding of the above and displays the capacity to agree with this current plan     3) Other: Labs/medical problems/ collateral- none        Venkat Regaladoyomi  4/19/2024 9:40 AM    Romain Overton MD  [facility]  04/19/2024 1:52 PM

## 2024-04-19 NOTE — PROGRESS NOTES
Group Psychotherapy (PhD/LCSW)    Site: [facility]    Clinical status of patient: Intensive Outpatient Program (IOP)    Date: 4/19/2024    Group Focus: Psychodynamic Processing     Length of service: 92883 - 45-50 minutes    Number of patients in attendance: 7    Referred by: [facility]     Target symptoms: Mood Disorder    Patient's response to treatment: Active Listening, and Self-disclosure,    Progress toward goals: Progressing adequately    Interval History: Patient offered support to fellow group members about the difficulties of prioritizing their own care while feeling the need to provide for others. Likewise the patient validated the experience of a group member struggling to set boundaries and offered his reflections on what he appreciated about and learned from the graduating IOP member.     Diagnosis:     ICD-10-CM ICD-9-CM   1. Severe episode of recurrent major depressive disorder, without psychotic features  F33.2 296.33   2. SANGEETHA (generalized anxiety disorder)  F41.1 300.02       Plan: Continue treatment in [facility] program

## 2024-04-19 NOTE — PLAN OF CARE
04/19/24 1346   Activity/Group Therapy Checklist   Group Meditation/Relaxation   Attendance Attended   Follows Direction Followed directions   Group Interactions/Observations Interacted appropriately;Sharing;Supportive;Alert   Affect/Mood Range Normal range   Affect/Mood Display Appropriate   Goal Progression Progressing

## 2024-04-22 ENCOUNTER — HOSPITAL ENCOUNTER (OUTPATIENT)
Dept: PSYCHIATRY | Facility: HOSPITAL | Age: 24
Discharge: HOME | End: 2024-04-22
Attending: STUDENT IN AN ORGANIZED HEALTH CARE EDUCATION/TRAINING PROGRAM
Payer: COMMERCIAL

## 2024-04-22 VITALS
SYSTOLIC BLOOD PRESSURE: 113 MMHG | HEART RATE: 62 BPM | TEMPERATURE: 96 F | DIASTOLIC BLOOD PRESSURE: 66 MMHG | RESPIRATION RATE: 18 BRPM

## 2024-04-22 DIAGNOSIS — F33.2 SEVERE EPISODE OF RECURRENT MAJOR DEPRESSIVE DISORDER, WITHOUT PSYCHOTIC FEATURES: Primary | ICD-10-CM

## 2024-04-22 PROCEDURE — 99232 SBSQ HOSP IP/OBS MODERATE 35: CPT | Mod: GT,,, | Performed by: STUDENT IN AN ORGANIZED HEALTH CARE EDUCATION/TRAINING PROGRAM

## 2024-04-22 PROCEDURE — 90853 GROUP PSYCHOTHERAPY: CPT

## 2024-04-22 NOTE — MEDICAL/APP STUDENT
"[facility] PROGRESS NOTE    PATIENT NAME: Shabbir Alicea  MRN: [id number]  ENCOUNTER DATE:  2024 10:25 AM   SITE:  U unit, [facility]  ADMIT DATE:   Pt Name: Shabbir Alicea   : 2000   MRN: [id number]      HISTORY OF PRESENTING ILLNESS:  Shabbir Alicea is a 24 y.o. male with no psychiatric history who is admitted to BMU for worsening depression and anxiety.     Today,  Pt reports "lower mood than usual" but attributes this to just being the start of the week. Patient went out over the weekend and to events with his girlfriend and enquired about direction after the program ended and if he can receive assistance in finding care.       Risk Parameters:  Patient reports no suicidal ideation  Patient reports no homicidal ideation  Patient reports no self-injurious behavior  Patient reports no violent behavior    Current psych meds  Lexapro 10mg daily    Medication side effects:  None    Current Substance use  Alcohol : None  Nicotine:  None  Illicit's: None  Other Rx controlled substances (Ex-opiates, stimulants etc): None      PAST PSYCHIATRIC, MEDICAL, AND SOCIAL HISTORY REVIEWED  The patient's past medical, family and social history have been reviewed and updated as appropriate within the electronic medical record     MEDICAL REVIEW OF SYSTEMS  History obtained from the patient  General : NO chills or fever  Respiratory: NO cough, shortness of breath  Cardiovascular: NO chest pain, palpitations or racing heart  Gastrointestinal: NO nausea, vomiting, constipation or diarrhea  Neurological: NO confusion, dizziness, headaches or tremors  Psychiatric: please see HPI     ALL MEDICATIONS:    Current Outpatient Medications:     EScitalopram oxalate (LEXAPRO) 10 MG tablet, Take 1 tablet (10 mg total) by mouth once daily., Disp: 30 tablet, Rfl: 0    EScitalopram oxalate (LEXAPRO) 5 MG Tab, Take 1 tablet (5 mg total) by mouth nightly., Disp: 30 tablet, Rfl: 0    ALLERGIES:  Review of " "patient's allergies indicates:  Not on File    RELEVANT LABS/STUDIES:    Lab Results   Component Value Date    WBC 6.44 04/16/2024    HGB 13.4 (L) 04/16/2024    HCT 40.7 04/16/2024    MCV 93 04/16/2024     04/16/2024     BMP  Lab Results   Component Value Date     04/16/2024    K 4.0 04/16/2024     04/16/2024    CO2 31 (H) 04/16/2024    BUN 12 04/16/2024    CREATININE 0.8 04/16/2024    CALCIUM 9.3 04/16/2024    ANIONGAP 8 04/16/2024     Lab Results   Component Value Date    ALT 13 04/16/2024    AST 15 04/16/2024    ALKPHOS 66 04/16/2024    BILITOT 0.3 04/16/2024     Lab Results   Component Value Date    TSH 0.657 04/16/2024     No results found for: "LABA1C", "HGBA1C"    VITALS  defer to nursing note    PHYSICAL EXAM  General: well developed, well nourished  Neurologic:   Gait: Normal   Psychomotor signs:  No involuntary movements or tremor  AIMS: none    PSYCHIATRIC EXAM:     Mental Status Exam:  Arousal: alert  Sensorium/Orientation: oriented to grossly intact  Behavior/Cooperation: {exam; behavior :87539::"normal","cooperative"}   Speech: normal tone, normal rate, normal pitch, normal volume  Language: grossly intact  Mood: " Lower than usual but okay "   Affect: appropriate  Thought Process: normal and logical  Thought Content:   Auditory hallucinations: NO  Visual hallucinations: NO  Paranoia: NO  Delusions:  NO  Suicidal ideation: NO  Homicidal ideation: NO  Attention/Concentration:  intact  Memory:    Recent:  Intact   Remote: Intact   3/3 immediate  Fund of Knowledge: Aware of current events   Abstract reasoning: proverbs were abstract, similarities were abstract  Insight: intact  Judgment: behavior is adequate to circumstances       IMPRESSION:    Shabbir Alicea is a 24 y.o. male with no psychiatric history who is admitted to BMU for worsening depression and anxiety.    Status/Progress:  Based on the examination today, the patient's problem(s) is/are improved.  New problems have not " been presented today.     DIAGNOSES:  No diagnosis found.    PLAN:    1) Continue BMU program with group and individual therapies.     2) Psych Med:  Continue Lexapro 10 mg daily    Discussed with patient informed consent, risks vs. benefits, alternative treatments, side effect profile and the inherent unpredictability of individual responses to these treatments. Answered any questions patient may have had. The patient expresses understanding of the above and displays the capacity to agree with this current plan     3) Other: Labs/medical problems/ collateral- none needed        Venkat Archer  4/22/2024 10:25 AM

## 2024-04-22 NOTE — PROGRESS NOTES
Group Psychotherapy (PhD/LCSW)    Site: [facility]    Clinical status of patient: Intensive Outpatient Program (IOP)    Date: 4/22/2024    Group Focus: Distress Tolerance    Length of service: 71710 - 45-50 minutes    Number of patients in attendance: 10    Referred by: [facility]     Target symptoms: Mood Disorder    Patient's response to treatment: Active Listening, and Self-disclosure,    Progress toward goals: Progressing adequately    Interval History: Session focus was Distress Tolerance:  Distract with ACCEPTS.  Patients were encouraged to use activities, contributing, comparisons, different emotions, pushing away, other thoughts, and sensations to reduce intensity of distress.        Diagnosis:     ICD-10-CM ICD-9-CM   1. Severe episode of recurrent major depressive disorder, without psychotic features  F33.2 296.33          Plan: Continue treatment on [facility]

## 2024-04-22 NOTE — PLAN OF CARE
04/22/24 1510   Activity/Group Therapy Checklist   Group Other (Comments)  (Processing)   Attendance Attended   Follows Direction Followed directions   Group Interactions/Observations Interacted appropriately;Sharing;Supportive;Alert   Affect/Mood Range Normal range   Affect/Mood Display Appropriate   Goal Progression Progressing

## 2024-04-23 ENCOUNTER — HOSPITAL ENCOUNTER (OUTPATIENT)
Dept: PSYCHIATRY | Facility: HOSPITAL | Age: 24
Discharge: HOME | End: 2024-04-23
Attending: STUDENT IN AN ORGANIZED HEALTH CARE EDUCATION/TRAINING PROGRAM
Payer: COMMERCIAL

## 2024-04-23 DIAGNOSIS — F33.2 SEVERE EPISODE OF RECURRENT MAJOR DEPRESSIVE DISORDER, WITHOUT PSYCHOTIC FEATURES: Primary | ICD-10-CM

## 2024-04-23 PROCEDURE — 99232 SBSQ HOSP IP/OBS MODERATE 35: CPT | Mod: ,,, | Performed by: PSYCHIATRY & NEUROLOGY

## 2024-04-23 PROCEDURE — 90853 GROUP PSYCHOTHERAPY: CPT | Mod: ,,, | Performed by: PSYCHOLOGIST

## 2024-04-23 PROCEDURE — 90853 GROUP PSYCHOTHERAPY: CPT

## 2024-04-23 NOTE — PROGRESS NOTES
Group Psychotherapy (PhD/LCSW)    Site: [facility]    Clinical status of patient: Intensive Outpatient Program (IOP)    Date: 04/22/2024    Group Focus: Sleep 101    Length of service: 80638 - 45-50 minutes    Number of patients in attendance: 9    Referred by: [facility]     Target symptoms: Mood Disorder    Patient's response to treatment: Active Listening, and Self-disclosure,    Progress toward goals: Progressing adequately    Interval History: Session focus was on the use and implementation of relaxation techniques to help improve sleep quality and counteract negative sleep thoughts. Patients explored the connection between arousal and sleep. Patients engaged in relaxation techniques to promote relaxation including diaphragmatic breathing, paced breathing, visualization, body scan mediations and progressive muscle relaxation. Patients reviewed their sleep diaries, made adjustments to sleep compression times, and attended to stimulus control.     Diagnosis:     ICD-10-CM ICD-9-CM   1. Severe episode of recurrent major depressive disorder, without psychotic features  F33.2 296.33       Plan: Continue treatment in [facility] program

## 2024-04-23 NOTE — PROGRESS NOTES
"[facility] PROGRESS NOTE    PATIENT NAME: Shabbir Alicea  MRN: [id number]  ENCOUNTER DATE:  2024 9:11 AM   SITE:  [facility], [facility]  ADMIT DATE:   Pt Name: Shabbir Alicea   : 2000   MRN: [id number]      HISTORY OF PRESENTING ILLNESS:  Shabbir Alicea is a 24 y.o. male with no psychiatric history who is admitted to BMU for worsening depression and anxiety.     Today, patient reports feeling "good" and that his mother and girlfriend are both "doing well" which is an improvement. Group going well. Taking meds as prescribed and denies side effects. Sleeping well. No SI, HI, NSSIB.     Risk Parameters:  Patient reports no suicidal ideation  Patient reports no homicidal ideation  Patient reports no self-injurious behavior  Patient reports no violent behavior    Current psych meds  Lexapro 10 mg daily    Medication side effects:  None    Current Substance use  Alcohol : None  Nicotine:  None  Illicit's: None  Other Rx controlled substances (Ex-opiates, stimulants etc): None      PAST PSYCHIATRIC, MEDICAL, AND SOCIAL HISTORY REVIEWED  The patient's past medical, family and social history have been reviewed and updated as appropriate within the electronic medical record     MEDICAL REVIEW OF SYSTEMS  History obtained from the patient  General : NO chills or fever  Respiratory: NO cough, shortness of breath  Cardiovascular: NO chest pain, palpitations or racing heart  Gastrointestinal: NO nausea, vomiting, constipation or diarrhea  Neurological: NO confusion, dizziness, headaches or tremors  Psychiatric: please see HPI     ALL MEDICATIONS:    Current Outpatient Medications:     EScitalopram oxalate (LEXAPRO) 10 MG tablet, Take 1 tablet (10 mg total) by mouth once daily., Disp: 30 tablet, Rfl: 0    EScitalopram oxalate (LEXAPRO) 5 MG Tab, Take 1 tablet (5 mg total) by mouth nightly., Disp: 30 tablet, Rfl: 0    ALLERGIES:  Review of patient's allergies indicates:  Not on " "File    RELEVANT LABS/STUDIES:    Lab Results   Component Value Date    WBC 6.44 04/16/2024    HGB 13.4 (L) 04/16/2024    HCT 40.7 04/16/2024    MCV 93 04/16/2024     04/16/2024     BMP  Lab Results   Component Value Date     04/16/2024    K 4.0 04/16/2024     04/16/2024    CO2 31 (H) 04/16/2024    BUN 12 04/16/2024    CREATININE 0.8 04/16/2024    CALCIUM 9.3 04/16/2024    ANIONGAP 8 04/16/2024     Lab Results   Component Value Date    ALT 13 04/16/2024    AST 15 04/16/2024    ALKPHOS 66 04/16/2024    BILITOT 0.3 04/16/2024     Lab Results   Component Value Date    TSH 0.657 04/16/2024     No results found for: "LABA1C", "HGBA1C"    VITALS  defer to nursing note    PHYSICAL EXAM  General: well developed, well nourished  Neurologic:   Gait: Normal   Psychomotor signs:  No involuntary movements or tremor  AIMS: none    PSYCHIATRIC EXAM:     Mental Status Exam:  Arousal: alert  Sensorium/Orientation: oriented to grossly intact  Behavior/Cooperation: normal, cooperative   Speech: normal tone, normal rate, normal pitch, normal volume  Language: grossly intact  Mood: " Good "   Affect: appropriate, mood congruent  Thought Process: normal and logical  Thought Content:   Auditory hallucinations: NO  Visual hallucinations: NO  Paranoia: NO  Delusions:  NO  Suicidal ideation: NO  Homicidal ideation: NO  Attention/Concentration:  intact  Memory:    Recent:  Intact   Remote: Intact   3/3 immediate  Fund of Knowledge: Aware of current events   Abstract reasoning: proverbs were abstract, similarities were abstract  Insight: intact  Judgment: behavior is adequate to circumstances       IMPRESSION:    Shabbir Alicea is a 24 y.o. male with no psychiatric history who is admitted to BMU for worsening depression and anxiety.    Status/Progress:  Based on the examination today, the patient's problem(s) is/are  improving .  New problems have not been presented today.     DIAGNOSES:  MDD, recurrent, " severe  SANGEETHA  Social Anxiety Disorder  R/o ASD    PLAN:    1) Continue BMU program with group and individual therapies.     2) Psych Med:  Continue Lexapro 10 mg daily    Discussed with patient informed consent, risks vs. benefits, alternative treatments, side effect profile and the inherent unpredictability of individual responses to these treatments. Answered any questions patient may have had. The patient expresses understanding of the above and displays the capacity to agree with this current plan     3) Other: Labs/medical problems/ collateral- none needed      Venkat Archer  4/23/2024 9:11 AM    Romain Overton MD  [facility]  04/23/2024 11:03 AM

## 2024-04-23 NOTE — MEDICAL/APP STUDENT
"[facility] PROGRESS NOTE    PATIENT NAME: Shabbir Alicea  MRN: [id number]  ENCOUNTER DATE:  2024 9:11 AM   SITE:  [facility] [place] [facility]  ADMIT DATE:   Pt Name: Shabbir Alicea   : 2000   MRN: [id number]      HISTORY OF PRESENTING ILLNESS:  Shabbir Alicea is a 24 y.o. male with no psychiatric history who is admitted to BMU for worsening depression and anxiety.     Today, patient reports feeling "good" and that his mother and girlfriend are both "doing well" which is an improvement.     Discussed medication regimen with the patient and he stated his desire to "do the aftercare program". Spoke with the patient on getting on the wait list for the aftercare program.      Risk Parameters:  Patient reports no suicidal ideation  Patient reports no homicidal ideation  Patient reports no self-injurious behavior  Patient reports no violent behavior    Current psych meds  Lexapro 10 mg daily    Medication side effects:  None    Current Substance use  Alcohol : None  Nicotine:  None  Illicit's: None  Other Rx controlled substances (Ex-opiates, stimulants etc): None      PAST PSYCHIATRIC, MEDICAL, AND SOCIAL HISTORY REVIEWED  The patient's past medical, family and social history have been reviewed and updated as appropriate within the electronic medical record     MEDICAL REVIEW OF SYSTEMS  History obtained from the patient  General : NO chills or fever  Respiratory: NO cough, shortness of breath  Cardiovascular: NO chest pain, palpitations or racing heart  Gastrointestinal: NO nausea, vomiting, constipation or diarrhea  Neurological: NO confusion, dizziness, headaches or tremors  Psychiatric: please see HPI     ALL MEDICATIONS:    Current Outpatient Medications:     EScitalopram oxalate (LEXAPRO) 10 MG tablet, Take 1 tablet (10 mg total) by mouth once daily., Disp: 30 tablet, Rfl: 0    EScitalopram oxalate (LEXAPRO) 5 MG Tab, Take 1 tablet (5 mg total) by mouth nightly., Disp: 30 tablet, " "Rfl: 0    ALLERGIES:  Review of patient's allergies indicates:  Not on File    RELEVANT LABS/STUDIES:    Lab Results   Component Value Date    WBC 6.44 04/16/2024    HGB 13.4 (L) 04/16/2024    HCT 40.7 04/16/2024    MCV 93 04/16/2024     04/16/2024     BMP  Lab Results   Component Value Date     04/16/2024    K 4.0 04/16/2024     04/16/2024    CO2 31 (H) 04/16/2024    BUN 12 04/16/2024    CREATININE 0.8 04/16/2024    CALCIUM 9.3 04/16/2024    ANIONGAP 8 04/16/2024     Lab Results   Component Value Date    ALT 13 04/16/2024    AST 15 04/16/2024    ALKPHOS 66 04/16/2024    BILITOT 0.3 04/16/2024     Lab Results   Component Value Date    TSH 0.657 04/16/2024     No results found for: "LABA1C", "HGBA1C"    VITALS  defer to nursing note    PHYSICAL EXAM  General: well developed, well nourished  Neurologic:   Gait: Normal   Psychomotor signs:  No involuntary movements or tremor  AIMS: none    PSYCHIATRIC EXAM:     Mental Status Exam:  Arousal: alert  Sensorium/Orientation: oriented to grossly intact  Behavior/Cooperation: normal, cooperative   Speech: normal tone, normal rate, normal pitch, normal volume  Language: grossly intact  Mood: " Good "   Affect: appropriate  Thought Process: normal and logical  Thought Content:   Auditory hallucinations: NO  Visual hallucinations: NO  Paranoia: NO  Delusions:  NO  Suicidal ideation: NO  Homicidal ideation: NO  Attention/Concentration:  intact  Memory:    Recent:  Intact   Remote: Intact   3/3 immediate  Fund of Knowledge: Aware of current events   Abstract reasoning: proverbs were abstract, similarities were abstract  Insight: intact  Judgment: behavior is adequate to circumstances       IMPRESSION:    Shabbir Alicea is a 24 y.o. male with no psychiatric history who is admitted to BMU for worsening depression and anxiety.    Status/Progress:  Based on the examination today, the patient's problem(s) is/are improved.  New problems have not been presented " today.     DIAGNOSES:  MDD, recurrent, severe  SAGNEETHA  Social Anxiety Disorder  R/o ASD    PLAN:    1) Continue BMU program with group and individual therapies.     2) Psych Med:  Continue Lexapro 10 mg daily    Discussed with patient informed consent, risks vs. benefits, alternative treatments, side effect profile and the inherent unpredictability of individual responses to these treatments. Answered any questions patient may have had. The patient expresses understanding of the above and displays the capacity to agree with this current plan     3) Other: Labs/medical problems/ collateral- none needed        Venkat Archer  4/23/2024 9:11 AM

## 2024-04-23 NOTE — PROGRESS NOTES
Group Psychotherapy (PhD/LCSW)    Site: [facility]    Clinical status of patient: Intensive Outpatient Program (IOP)    Date: 4/23/2024    Group Focus: Interpersonal Effectiveness    Length of service: 28408 - 45-50 minutes    Number of patients in attendance: 9    Referred by: [facility]     Target symptoms: Mood Disorder    Patient's response to treatment: Active Listening, and Self-disclosure,    Progress toward goals: Progressing adequately    Interval History: Session focus was Interpersonal Effectiveness:  DEAR MAN.  Patients were encouraged to exercise skillfulness during interactions by using this framework.        Diagnosis:     ICD-10-CM ICD-9-CM   1. Severe episode of recurrent major depressive disorder, without psychotic features  F33.2 296.33          Plan: Continue treatment on [facility]

## 2024-04-23 NOTE — PLAN OF CARE
04/23/24 1418   Activity/Group Therapy Checklist   Group Other (Comments)  (Boundaries)   Attendance Attended   Follows Direction Followed directions   Group Interactions/Observations Interacted appropriately;Sharing;Supportive;Alert   Affect/Mood Range Normal range   Affect/Mood Display Appropriate   Goal Progression Progressing      facilitated group therapy session and discussed personal boundaries. Defined boundaries and discussed the importance of establishing healthy boundaries. Discussed common traits of rigid, porous and healthy boundaries and asked pts to identify where they fell on the spectrum. Patients completed a boundary exploration activity. Pts were to identify someone or a group of people whom they struggle to set a boundary with and identify what type of boundaries were in place currently with each category based on the person/group selected. Pts reflected on questions: What specific actions could be taken to improve current boundaries? How do they think the other person/ group would respond to the changes? And how do they think life would be different once the healthier boundaries were established.

## 2024-04-23 NOTE — PROGRESS NOTES
"[facility] PROGRESS NOTE    PATIENT NAME: Shabbir Alicea  MRN: [id number]  ENCOUNTER DATE:  2024 9:40 AM   SITE:  U unit, [facility]  ADMIT DATE:   Pt Name: Shabbir Alicea   : 2000   MRN: [id number]      HISTORY OF PRESENTING ILLNESS:  Shabbir Alicea is a 24 y.o. male with no history of prior psychiatric diagnosis who is admitted to BMU for worsening depression and anxiety.    Today,  Pt reports "lower mood than usual" but attributes this to just being the start of the week. Patient went out over the weekend and to events with his girlfriend and enquired about direction after the program ended and if he can receive assistance in finding care. He is currently sleeping well. Denies changes in appetite. Denies side effects from medication and has been tolerating the Lexapro well. Denies SI/HI. No other complaints at this time.    Risk Parameters:  Patient reports no suicidal ideation  Patient reports no homicidal ideation  Patient reports no self-injurious behavior  Patient reports no violent behavior    Current psych meds  Lexapro 5 mg    Medication side effects:  None    Current Substance use  Alcohol : None/  Nicotine:  None  Illicit's: None  Other Rx controlled substances (Ex-opiates, stimulants etc): None      PAST PSYCHIATRIC, MEDICAL, AND SOCIAL HISTORY REVIEWED  The patient's past medical, family and social history have been reviewed and updated as appropriate within the electronic medical record     MEDICAL REVIEW OF SYSTEMS  History obtained from the patient  General : NO chills or fever  Respiratory: NO cough, shortness of breath  Cardiovascular: NO chest pain, palpitations or racing heart  Gastrointestinal: NO nausea, vomiting, constipation or diarrhea  Neurological: NO confusion, dizziness, headaches or tremors  Psychiatric: please see HPI    ALL MEDICATIONS:    Current Outpatient Medications:     EScitalopram oxalate (LEXAPRO) 10 MG tablet, Take 1 tablet (10 " "mg total) by mouth once daily., Disp: 30 tablet, Rfl: 0    EScitalopram oxalate (LEXAPRO) 5 MG Tab, Take 1 tablet (5 mg total) by mouth nightly., Disp: 30 tablet, Rfl: 0    ALLERGIES:  Review of patient's allergies indicates:  Not on File    RELEVANT LABS/STUDIES:    Lab Results   Component Value Date    WBC 6.44 04/16/2024    HGB 13.4 (L) 04/16/2024    HCT 40.7 04/16/2024    MCV 93 04/16/2024     04/16/2024     BMP  Lab Results   Component Value Date     04/16/2024    K 4.0 04/16/2024     04/16/2024    CO2 31 (H) 04/16/2024    BUN 12 04/16/2024    CREATININE 0.8 04/16/2024    CALCIUM 9.3 04/16/2024    ANIONGAP 8 04/16/2024     Lab Results   Component Value Date    ALT 13 04/16/2024    AST 15 04/16/2024    ALKPHOS 66 04/16/2024    BILITOT 0.3 04/16/2024     Lab Results   Component Value Date    TSH 0.657 04/16/2024     No results found for: "LABA1C", "HGBA1C"    VITALS  defer to nursing note    PHYSICAL EXAM  General: well developed, well nourished  Neurologic:   Gait: Normal   Psychomotor signs:  No involuntary movements or tremor  AIMS: none    PSYCHIATRIC EXAM:     Mental Status Exam:  Arousal: alert  Sensorium/Orientation: oriented to grossly intact  Behavior/Cooperation: normal   Speech: normal tone, normal rate, normal pitch, normal volume  Language: grossly intact, able to name, able to repeat  Mood: " Good "   Affect: appropriate, mood congruent  Thought Process: normal and logical  Thought Content:   Auditory hallucinations: NO  Visual hallucinations: NO  Paranoia: NO  Delusions:  NO  Suicidal ideation: NO  Homicidal ideation: NO  Attention/Concentration:  intact  Memory:    Recent:  Intact   Remote: Intact  Fund of Knowledge: Aware of current events   Insight: intact, has awareness of illness  Judgment: behavior is adequate to circumstances       IMPRESSION:    Shabbir Alicea is a 24 y.o. male with no psychiatric history who is admitted to BMU for worsening depression and " anxiety.    Status/Progress:  Based on the examination today, the patient's problem(s) is/are  improving .  New problems have not been presented today.     DIAGNOSES:  MDD, recurrent, severe  SANGEETHA  Social Anxiety Disorder  R/o ASD    PLAN:    1) Continue BMU program with group and individual therapies.     2) Psych Med:  Continue Lexapro 10 mg daily     Discussed with patient informed consent, risks vs. benefits, alternative treatments, side effect profile and the inherent unpredictability of individual responses to these treatments. Answered any questions patient may have had. The patient expresses understanding of the above and displays the capacity to agree with this current plan     3) Other: Labs/medical problems/ collateral- none        Venkat Archer, MSY-III  [facility]    Parts of this note were prepared by medical student. I have individually assessed the patient and collaborated with above student on documentation.     Fermin Hanna MD  [facility], PGY-IV   04/22/2024 1:52 PM

## 2024-04-24 ENCOUNTER — HOSPITAL ENCOUNTER (OUTPATIENT)
Dept: PSYCHIATRY | Facility: HOSPITAL | Age: 24
Discharge: HOME | End: 2024-04-24
Attending: STUDENT IN AN ORGANIZED HEALTH CARE EDUCATION/TRAINING PROGRAM
Payer: COMMERCIAL

## 2024-04-24 DIAGNOSIS — F33.2 SEVERE EPISODE OF RECURRENT MAJOR DEPRESSIVE DISORDER, WITHOUT PSYCHOTIC FEATURES: Primary | ICD-10-CM

## 2024-04-24 PROCEDURE — 90853 GROUP PSYCHOTHERAPY: CPT | Mod: ,,,

## 2024-04-24 PROCEDURE — 90853 GROUP PSYCHOTHERAPY: CPT | Mod: ,,, | Performed by: PSYCHOLOGIST

## 2024-04-24 NOTE — PROGRESS NOTES
Group Psychotherapy (PhD/LCSW)    Site: [facility]    Clinical status of patient: Intensive Outpatient Program (IOP)    Date: 4/24/2024    Group Focus: Psychodynamic Processing     Length of service: 26885 - 45-50 minutes    Number of patients in attendance: 6    Referred by: [facility]     Target symptoms: Mood Disorder    Patient's response to treatment: Active Listening, and Self-disclosure,    Progress toward goals: Progressing adequately    Interval History: Patients helped two group members process and resolve conflict from event that occurred during a morning group. Patients also welcomed new patient to the group. This patient discussed related personal examples of how he has navigated conflicts and strives to increase social connection. He provided encouragement to other group members and remained engaged throughout the session.     Diagnosis:     ICD-10-CM ICD-9-CM   1. Severe episode of recurrent major depressive disorder, without psychotic features  F33.2 296.33       Plan: Continue treatment in [facility] program

## 2024-04-24 NOTE — PROGRESS NOTES
Group Psychotherapy (PhD/LCSW)    Site: [facility]    Clinical status of patient: Intensive Outpatient Program (IOP)    Date: 4/24/2024    Group Focus: Mindfulness    Length of service: 71280 - 45-50 minutes    Number of patients in attendance: 9    Referred by: [facility]     Target symptoms: Mood Disorder    Patient's response to treatment: Active Listening, and Self-disclosure,    Progress toward goals: Progressing adequately    Interval History: Session focus was Mindfulness: Mindfulness Mindfulness of Current Thoughts skills. Patient's were introduced to the Mindfulness of current thought skills of observe your thoughts, adopt a curious mind, remember: you are not your thoughts, and don't block or suppress your thoughts. Patients identified the value of each skill, how to use each skill, and practiced the use of each skill in session.       Diagnosis:     ICD-10-CM ICD-9-CM   1. Severe episode of recurrent major depressive disorder, without psychotic features  F33.2 296.33        Plan: Continue treatment on [facility]

## 2024-04-24 NOTE — PATIENT CARE CONFERENCE
Diagnoses:    MDD, recurrent, severe  SANGEETHA  Social Anxiety Disorder  R/o ASD    Progress:    Patient was staffed by Team. Pt has been cooperative and appropriate during program. Pt has been supportive and offered feedback to other members. Team will cotninue to monitor progress. Anticipating D/C on 4/25.    Plan of Care:    Patient will continue [facility] program.     Aftercare/Follow ups:  Med Management: TBD  Psychotherapy: TBD    Staff present:  MD Jonathan Heart, MD Romain Overton, Resident  Fermin Hanna MD , Resident   Venkat Donaldson, MS3  Luna Alvarado, PhD  Shabbir Zhang, PhD  Saad Augustine, LPN  Loreto Wilkins, CSW  Shweta Trujillo, RSW   Gretchen Wiggins,

## 2024-04-24 NOTE — PROGRESS NOTES
Group Psychotherapy (PhD/LCSW)    Site: [facility]    Clinical status of patient: Intensive Outpatient Program (IOP)    Date: 04/23/2024    Group Focus: Psychodynamic Processing     Length of service: 26317 - 45-50 minutes    Number of patients in attendance: 6    Referred by: [facility]     Target symptoms: Mood Disorder    Patient's response to treatment: Active Listening, and Self-disclosure,    Progress toward goals: Progressing adequately    Interval History: Patient welcomed new member to the group. The session was focused on exploring past experiences of invalidation/stigma and how they relate to current presenting concerns. Patient discussed generational differences and frustrating experiences with family members. He provided encouragement to other group members and remained engaged throughout the session.     Diagnosis:     ICD-10-CM ICD-9-CM   1. Severe episode of recurrent major depressive disorder, without psychotic features  F33.2 296.33       Plan: Continue treatment in [facility] program

## 2024-04-24 NOTE — PROGRESS NOTES
Group Psychotherapy (PhD/LCSW)     Site: [facility]     Clinical status of patient: Intensive Outpatient Program (IOP)     Date: 04/24/2024       Group Focus: Assertive Communication     Length of service: 19949 - 45-60 minutes     Number of patients in attendance: 6     Referred by: [facility]      Target symptoms: Depression      Patient's response to treatment: Active Listening and Self-Disclosure     Progress toward goals: Progressing adequately     Interval History: Participants reviewed Personal Bill of Rights and reflected on challenges associated with knowing, believing, and asserting personal rights. Participants reviewed distinctions among passive, aggressive, and assertive communication. Two participants discussed conflict that occurred between the two of them in a previous group. Participants reflected on dynamics and observations about factors that contributed to conflict and patterns of aggressive and assertive communication during the conflict. Participants reflected on challenges communicating assertively, including communicating assertively within the context of group therapy.      Diagnosis:    ICD-10-CM ICD-9-CM   1. Severe episode of recurrent major depressive disorder, without psychotic features  F33.2 296.33        Plan: Continue treatment on [facility]

## 2024-04-25 ENCOUNTER — HOSPITAL ENCOUNTER (OUTPATIENT)
Dept: PSYCHIATRY | Facility: HOSPITAL | Age: 24
Discharge: HOME | End: 2024-04-25
Attending: STUDENT IN AN ORGANIZED HEALTH CARE EDUCATION/TRAINING PROGRAM
Payer: COMMERCIAL

## 2024-04-25 DIAGNOSIS — F33.2 SEVERE EPISODE OF RECURRENT MAJOR DEPRESSIVE DISORDER, WITHOUT PSYCHOTIC FEATURES: Primary | ICD-10-CM

## 2024-04-25 DIAGNOSIS — F33.41 RECURRENT MAJOR DEPRESSIVE DISORDER, IN PARTIAL REMISSION: Chronic | ICD-10-CM

## 2024-04-25 DIAGNOSIS — F40.10 SOCIAL ANXIETY DISORDER: ICD-10-CM

## 2024-04-25 DIAGNOSIS — F41.1 GAD (GENERALIZED ANXIETY DISORDER): ICD-10-CM

## 2024-04-25 PROCEDURE — 90853 GROUP PSYCHOTHERAPY: CPT

## 2024-04-25 PROCEDURE — 99238 HOSP IP/OBS DSCHRG MGMT 30/<: CPT | Mod: ,,, | Performed by: PSYCHIATRY & NEUROLOGY

## 2024-04-25 NOTE — PROGRESS NOTES
Group Psychotherapy (PhD/LCSW)    Site: [facility]    Clinical status of patient: Intensive Outpatient Program (IOP)    Date: 4/25/2024    Group Focus: Emotion Regulation     Length of service: 48963 - 45-50 minutes    Number of patients in attendance: 9    Referred by: [facility]     Target symptoms: Mood Disorder    Patient's response to treatment: Active Listening, and Self-disclosure,    Progress toward goals: Progressing adequately    Interval History: Session focus was Emotion Regulation:  Opposite Action.  Patients identified action urges for each emotion and learned how to engage in opposite action when the emotions are not justified or unhelpful.      Diagnosis:     ICD-10-CM ICD-9-CM   1. Severe episode of recurrent major depressive disorder, without psychotic features  F33.2 296.33   2. Recurrent major depressive disorder, in partial remission  F33.41 296.35   3. SANGEETHA (generalized anxiety disorder)  F41.1 300.02   4. Social anxiety disorder  F40.10 300.23        Plan: Continue treatment on [facility]

## 2024-04-25 NOTE — MEDICAL/APP STUDENT
"[facility] DISCHARGE SUMMARY    Discharge Date: 2024 9:16 AM   Pt Name: Shabbir Alicea   : 2000   MRN: [id number]     Admit Date:       SUBJECTIVE:  Shabbir Alicea is a 24 y.o. male with no psychiatric history who was admitted to BMU for worsening depression and anxiety.     Patient states feeling apprehensive about this being his last day, and expresses worry about "regressing back immediately" to old habits. He recognizes that setting boundaries and practicing self care are areas of improvement and things he should focus on when feeling stressed. He further recognizes that using calming and mindfulness strategies are crucial in preventing him from feeling "overwhelmed".     The patient expressed his desire to participate in the aftercare program and find a therapist. He was given guidance and direction on how social workers will help in navigating this.    Program course-   The patient was admitted to [facility] on 24 for treatment of depression and anxiety, which were self-rated as moderate and severe, respectively, on admission.  Throughout the program, the patient regularly attended scheduled group and individual therapy sessions in which a variety of therapeutic modalities and interventions were employed. Skills such as healthy communication, mindfulness, coping and risk reduction were also discussed at length.   Patient was noted to be well-engaged in groups, and participated appropriately in the program. Attendance and behavioral issues were not a problem during their admission to BMU.   Throughout his admission Shabbir met regularly with [facility]. His medication regimen was changed to include Lexapro and was titrated up to 10 mg daily.  On the day of discharge, patient voiced significant improvement in their symptoms. Her mood was stable, with mood-congruent affect. She denied SI/HI and evidenced no objective symptoms of psychosis or diane on discharge.  He " denied any issues with his current medication regimen, and felt it adequately treated their symptoms. Pt was discharged in stable condition to continue her behavioral health treatment in a less-acute setting. All questions were answered at their final discharge meeting.    PATIENT HEALTH QUESTIONNAIRE-9 ( P H Q - 9 )- DAY OF DISCHARGE    Over the last 2 weeks, how often have you been bothered by any of the following problems?  0-Not at all  1- Several days  2- More than half the days  3- Nearly every day    Little interest or pleasure in doing things 0 1 2 3  Feeling down, depressed, or hopeless 0 1 2 3  Trouble falling or staying asleep, or sleeping too much 0 1 2 3  Feeling tired or having little energy 0 1 2 3  Poor appetite or overeating 0 1 2 3  Feeling bad about yourself -- or that you are a failure or have let yourself or your family down 0 1 2 3  Trouble concentrating on things, such as reading the newspaper or watching television 0 1 2 3  Moving or speaking so slowly that other people could have noticed? Or the opposite -- being so fidgety or restless that you have been moving around a lot more than usual 0 1 2 3  Thoughts that you would be better off dead or of hurting yourself in some way 0 1 2 3    Total Score: __9____    If you checked off any problems, how difficult have these problems made it for you to do your  work, take care of things at home, or get along with other people?  Not difficult at all  Somewhat difficult  Very difficult  Extremely difficult    Developed by Kimberly Presley, Nury Stanton, Tapan Diaz and colleagues, with an educational sukhdeep from  [employer] No permission required to reproduce, translate, display or distribute.    PHQ-9 Patient Depression Questionnaire Explanation  Interpretation of Total Score  Total Score Depression Severity  1-4 Minimal depression  5-9 Mild depression  10-14 Moderate depression  15-19 Moderately severe depression  20-27 Severe  depression    PHQ9 Copyright © [employer] All rights reserved. Reproduced with permission. PRIME-MD ® is a  trademark of [employer]  R1024U 10-       GENERALIZED ANXIETY DISORDER SCALE (SANGEETHA -7)    Over the last two weeks, how often have you been bothered by the following problems?  0-Not at all  1- Several days  2- More than half the days  3- Nearly every day    1. Feeling nervous, anxious, or on edge-0 1 2 3  2. Not being able to stop or control worrying-0 1 2 3  3. Worrying too much about different things- 0 1 2 3  4. Trouble relaxing-0 1 2 3  5. Being so restless that it is hard to sit still- 0 1 2 3  6. Becoming easily annoyed or irritable- 0 1 2 3  7. Feeling afraid, as if something awful  might happen- 0 1 2 3     If you checked any problems, how difficult have they made it for you to do your work, take care of things at home, or get along with other people?  Somewhat difficult    Scoring SANGEETHA-7 Anxiety Severity =  /21  This is calculated by assigning scores of 0, 1, 2, and 3 to the response categories, respectively, of not at all, several days, more than half the days, and nearly every day. SANGEETHA-7 total score for the seven items ranges from 0 to 21.  0-4: minimal anxiety  5-9: mild anxiety  10-14: moderate anxiety  15-21: severe anxiety    Source: Primary Care Evaluation of Mental Disorders Patient Health Questionnaire (PRIME-MD-PHQ). The PHQ was developed by Kimberly Presley, Nury Stanton, Tapan Diaz, and colleagues. For research information, contact Dr. Presley at [email or web address]. PRIME-MD® is a trademark of [employer]. Copyright© 1999 [employer] All rights reserved. Reproduced with permission     Risk Parameters:  Patient reports no suicidal ideation  Patient reports no homicidal ideation  Patient reports no self-injurious behavior  Patient reports no violent behavior    Allergies:   Review of patient's allergies indicates:  Not on File      Current Medications:   - Lexapro  "10 mg daily    Medication side effects:  Has noticed fatigue    OBJECTIVE:   Vitals (Most Recent)  There were no vitals filed for this visit.; defer to nursing note      Labs/Imaging/Studies:   No results found for this or any previous visit (from the past 48 hour(s)).   No results found for: "PHENYTOIN", "PHENOBARB", "VALPROATE", "CBMZ"    Mental Status Exam:  General Appearance: appears stated age, well developed and nourished, adequately groomed and appropriately dressed, in no acute distress  Behavior: normal; cooperative; reasonably friendly, pleasant, and polite; appropriate eye-contact; under good behavioral control  Involuntary Movements and Motor Activity: no abnormal involuntary movements noted; no tics, no tremors, no akathisia, no dystonia, no evidence of tardive dyskinesia; no psychomotor agitation or retardation  Gait and Station: intact, normal gait and station, ambulates without assistance  Speech and Language: intact; normal rate, rhythm, volume, tone, and pitch; conversational, spontaneous, and coherent; speaks and understands English proficiently and fluently; repeats words and phrases, no word finding difficulties are noted  Mood: "Good"  Affect: normal, euthymic, reactive, full-range, mood-congruent, appropriate to situation and context  Thought Process and Associations: intact; linear, goal-directed, organized, and logical; no loosening of associations noted  Thought Content and Perceptions:: no suicidal or homicidal ideation, no auditory or visual hallucinations, no paranoid ideation, no ideas of reference, no evidence of delusions or psychosis  Sensorium and Orientation: intact; alert with clear sensorium; oriented fully to person, place, time and situation  Recent and Remote Memory: grossly intact, able to recall relevant and salient information from the recent and remote past  Attention and Concentration: grossly intact, attentive to the conversation and not readily distractible  Fund of " Knowledge: grossly intact, used appropriate vocabulary and demonstrated an awareness of current events, consistent with educational level achieved  Insight: intact, demonstrates awareness of illness and situation  Judgment: intact, behavior is adequate/appropriate to the circumstances, compliant with health provider's recommendations and instructions      AIMS: if on an antipsychotic    ASSESSMENT/PLAN:   General Assessment:  Patient is a 24 y.o., male, admitted to the BMU partial hospitalization program  for stabilization of *****.        Diagnosis:  MDD, partial remission  SANGEETHA  Social Anxiety Disorder  R/o ASD     Plan:  1. Completed BMU treatment with moderate improvement in presenting symptoms and was encouraged to attend after care groups for better transition to out pt care.     2. Psych meds  Continue current med regimen, tolerating well with out any side effects. Pt provided with enough refills until follow up appointment.  For Depression-- Lexapro 10 mg daily  Discussed with patient informed consent, risks vs. benefits, alternative treatments, side effect profile and the inherent unpredictability of individual responses to these treatments. Answered any questions patient may have had. The patient expresses understanding of the above and displays the capacity to  agree with this.     3. Patient Discharge Instructions and informed to:-  Follow up regularly with Outpatient [facility] appointments for further psychiatric care and management. Please see SW note for after care appointments  Refrain from using excessive alcohol, avoid any illicit substances and or abuse of prescription medications, as this may worsen mood and may be detrimental to health.  Call the crisis line at: [phone] for help in a crisis and emergent situations and present to ED for any worsening of psychiatric symptoms or any SI/HI/AVH    Venkat Archer   4/25/2024 9:16 AM

## 2024-04-25 NOTE — PROGRESS NOTES
Group Psychotherapy (PhD/LCSW)    Site: [facility]    Clinical status of patient: Intensive Outpatient Program (IOP)    Date: 4/17/2024    Group Focus: Psychodynamic Processing     Length of service: 82986 - 45-50 minutes    Number of patients in attendance: 8    Referred by: [facility]     Target symptoms: Mood Disorder    Patient's response to treatment: Active Listening, and Self-disclosure,    Progress toward goals: Progressing adequately    Interval History:  Patients exchanged encouraging words with two group members who completed the program today. Additionally, patients helped graduating group members develop a plan to maintain progress. Lastly, group members reviewed effective coping skills and overall experiences in the IOP.  This patient remained engaged throughout the session.     Diagnosis:     ICD-10-CM ICD-9-CM   1. Severe episode of recurrent major depressive disorder, without psychotic features  F33.2 296.33       Plan: Continue treatment in [facility] program

## 2024-04-25 NOTE — PROGRESS NOTES
STAFF NOTE    The chart was reviewed and the case was discussed, including the assessment and management plan.  I have personally interviewed the patient, and agree with the overall findings, with corrections or clarifications, if any, noted herein.    *Patient has successfully completed the [facility], ready and appropriate for step down from IOP level of care.*     With reasonable medical certainty, based on history, chart review, available collateral information, and a present-state examination:  - the patient does not currently meet the criteria for psychiatric hospitalization  - the patient can be safely and effectively managed in the community  - adequate support, monitoring, and/or structures are in place     Jonathan Lozada MD  Board Certification: Psychiatry and Addiction Medicine

## 2024-04-25 NOTE — PLAN OF CARE
04/25/24 1509   Activity/Group Therapy Checklist   Group Other (Comments)  (Creative Therapy)   Attendance Attended   Follows Direction Followed directions   Group Interactions/Observations Interacted appropriately;Alert;Sharing;Supportive   Affect/Mood Range Normal range   Affect/Mood Display Appropriate   Goal Progression Progressing      facilitated creative session and discussed with patients how creative therapy can be therapeutic to resolving anxiety and other stress-related issues. SW discussed activity: Positive Affirmation Collage. Patients crafted collage posters durant depicted the center of their happiness and contributes to their self-esteem.

## 2024-04-25 NOTE — PLAN OF CARE
04/25/24 1431   Activity/Group Therapy Checklist   Group Other (Comments)  (Processing)   Attendance Attended   Follows Direction Followed directions   Group Interactions/Observations Interacted appropriately;Sharing;Supportive;Alert   Affect/Mood Range Normal range   Affect/Mood Display Appropriate   Goal Progression Progressing

## 2024-04-25 NOTE — DISCHARGE SUMMARY
"[facility] DISCHARGE SUMMARY    Discharge Date: 2024 11:39 AM   Pt Name: Shabbir Alicea   : 2000   MRN: [id number]     Admit Date:       SUBJECTIVE:  Patient is a 24 y.o. old, male, with no previous psychiatric history, admitted to BMU due to worsening anxiety and depression.    Program course-   The patient was admitted to [facility] on 24 for treatment of depression and anxiety, which were self-rated as moderate and severe, respectively, on admission.  Throughout the program, the patient regularly attended scheduled group and individual therapy sessions in which a variety of therapeutic modalities and interventions were employed. Skills such as healthy communication, mindfulness, coping and risk reduction were also discussed at length.   Patient was noted to be well-engaged in groups, and participated appropriately in the program. Attendance and behavioral issues were not a problem during their admission to BMU.   Throughout his admission Shabbir met regularly with [facility]. His medication regimen was changed to include Lexapro and was titrated up to 10 mg daily.  On the day of discharge, patient voiced significant improvement in their symptoms. His mood was stable, with mood-congruent affect. He denied SI/HI and evidenced no objective symptoms of psychosis or diane on discharge.  He denied any issues with his current medication regimen, and felt it adequately treated their symptoms. Pt was discharged in stable condition to continue his behavioral health treatment in a less-acute setting. All questions were answered at their final discharge meeting.    Today,   Patient states feeling apprehensive about this being his last day, and expresses worry about "regressing back immediately" to old habits. He recognizes that setting boundaries and practicing self care are areas of improvement and things he should focus on when feeling stressed. He further recognizes that using " "calming and mindfulness strategies are crucial in preventing him from feeling "overwhelmed".      The patient expressed his desire to participate in the aftercare program and find a therapist. He was given guidance and direction on how social workers will help in navigating this.    He reports sleeping well most nights and appetite is stable. Reports some anergia and anhedonia still present. He denies any thoughts of self-harm, stating that they are no longer present. Denies SI/HI.         Patient Health Questionnaire-9 (PHQ-9)  Over the last 2 weeks, how often have you been bothered by the following problems?    Little interest or pleasure in doing things +2 - More than half the days   Feeling down, depressed, and hopeless +1 - Several days   Trouble falling or staying asleep, or sleeping too much +2 - More than half the days   Feeling tired or having little energy +2 - More than half the days   Poor appetite or overeating +1 - Several days   Feeling bad about yourself - or that you are a failure or have let yourself or your family down + 0 - Not at all   Trouble concentrating on things, such as reading the newspaper or watching television + 0 - Not at all   Moving or speaking so slowly that other people could have noticed? Or so fidgety or restless that you have been moving a lot more than usual +1 - Several days   Thoughts that you would be better off dead, or thoughts of hurting yourself in some way + 0 - Not at all       How difficult have these problems made it for you to do your work, take care of things at home, or get along with other people? Somewhat difficult       Total score 9    Mild depression (5-9)     Developed by Kimberly Presley, Nury Stanton, Tapan Diaz and colleagues, with an educational sukhdeep from  [employer] No permission required to reproduce, translate, display or distribute.    PHQ9 Copyright © [employer]. All rights reserved. Reproduced with permission. PRIME-MD ® is " "a  trademark of [employer].    Generalized Anxiety Disorder 7-item (GAD7)  Over the last 2 weeks, how often have you been bothered by the following problems?    Feeling nervous, anxious or on edge +1 - Several days   Not being able to stop or control worrying +1 - Several days   Worrying too much about different things +1 - Several days   Trouble relaxing +1 - Several days   Being so restless that it is hard to sit still + 0 - Not at all   Becoming easily annoyed or irritable + 0 - Not at all   Feeling afraid as if something awful might happen + 0 - Not at all       How difficult have these problems made it for you to do your work, take care of things at home, or get along with other people? Somewhat difficult       Total score 4    Minimal anxiety (0-4)     Sensitivity 89%, Specificity 82%, Positive likelihood ratio 5.1; when score >10    Source: Primary Care Evaluation of Mental Disorders Patient Health Questionnaire (PRIME-MD-PHQ). The PHQ was developed by Kimberly Presley, Nury Stanton, Tapan Diaz, and colleagues. For research information, contact Dr. Presley at [email or web address]. PRIME-MD® is a trademark of [employer]. Copyright© 1999 [employer]. All rights reserved. Reproduced with permission       Risk Parameters:  Patient reports no suicidal ideation  Patient reports no homicidal ideation  Patient reports no self-injurious behavior  Patient reports no violent behavior    Allergies:   Review of patient's allergies indicates:  Not on File      Current Medications:   Lexapro 10 mg PO daily        OBJECTIVE:   Vitals (Most Recent)  There were no vitals filed for this visit.; defer to nursing note      Labs/Imaging/Studies:   No results found for this or any previous visit (from the past 48 hour(s)).   No results found for: "PHENYTOIN", "PHENOBARB", "VALPROATE", "CBMZ"    Mental Status Exam:  General Appearance: appears stated age, well developed and nourished, adequately groomed and appropriately " "dressed, in no acute distress  Behavior: normal; cooperative; reasonably friendly, pleasant, and polite; appropriate eye-contact; under good behavioral control  Involuntary Movements and Motor Activity: no abnormal involuntary movements noted; no tics, no tremors, no akathisia, no dystonia, no evidence of tardive dyskinesia; no psychomotor agitation or retardation  Gait and Station: intact, normal gait and station, ambulates without assistance  Speech and Language: intact; normal rate, rhythm, volume, tone, and pitch; conversational, spontaneous, and coherent; speaks and understands English proficiently and fluently; repeats words and phrases, no word finding difficulties are noted  Mood: "Good"  Affect: reactive, appropriate to situation and context, constricted  Thought Process and Associations: intact; linear, goal-directed, organized, and logical; no loosening of associations noted  Thought Content and Perceptions:: no suicidal or homicidal ideation, no auditory or visual hallucinations, no paranoid ideation, no ideas of reference, no evidence of delusions or psychosis  Sensorium and Orientation: intact; alert with clear sensorium; oriented fully to person, place, time and situation  Recent and Remote Memory: grossly intact, able to recall relevant and salient information from the recent and remote past  Attention and Concentration: grossly intact, attentive to the conversation and not readily distractible  Fund of Knowledge: grossly intact, used appropriate vocabulary and demonstrated an awareness of current events, consistent with educational level achieved  Insight: intact, demonstrates awareness of illness and situation  Judgment: intact, behavior is adequate/appropriate to the circumstances, compliant with health provider's recommendations and instructions      ASSESSMENT/PLAN:   General Assessment:  Patient is a 24 y.o., male, admitted to the [facility] partial hospitalization program  for stabilization of " depression and anxiety.        Diagnosis:  MDD, in partial remission  SANGEETHA  Social anxiety disorder    Plan:  1. Completed BMU treatment with much improvement in presenting symptoms and was encouraged to attend after care groups for better transition to out pt care.     2. Psych meds  Continue current med regimen, tolerating well with out any side effects. Pt provided with enough refills until follow up appointment.  For Depression and anxiety -- Lexapro 10 mg daily    Discussed with patient informed consent, risks vs. benefits, alternative treatments, side effect profile and the inherent unpredictability of individual responses to these treatments. Answered any questions patient may have had. The patient expresses understanding of the above and displays the capacity to  agree with this.     3. Patient Discharge Instructions and informed to:-  Follow up regularly with Outpatient [facility] appointments for further psychiatric care and management. Please see SW note for after care appointments  Refrain from using excessive alcohol, avoid any illicit substances and or abuse of prescription medications, as this may worsen mood and may be detrimental to health.  Call the crisis line at: [phone] for help in a crisis and emergent situations and present to ED for any worsening of psychiatric symptoms or any SI/HI/AVH    Venkat Archer, MSY-III  [employer]    Parts of this note were prepared by medical student. I have individually assessed the patient and collaborated with above student on documentation.     Fermin Hanna MD  [facility], PGY-IV   4/25/2024 11:39 AM

## 2024-04-29 ENCOUNTER — PATIENT MESSAGE (OUTPATIENT)
Dept: PSYCHIATRY | Facility: CLINIC | Age: 24
End: 2024-04-29
Payer: COMMERCIAL

## 2024-05-02 ENCOUNTER — CLINICAL SUPPORT (OUTPATIENT)
Dept: PSYCHIATRY | Facility: CLINIC | Age: 24
End: 2024-05-02
Payer: COMMERCIAL

## 2024-05-02 DIAGNOSIS — Z71.9 ENCOUNTER FOR EDUCATION: Primary | ICD-10-CM

## 2024-05-02 PROCEDURE — 99499 UNLISTED E&M SERVICE: CPT | Mod: S$GLB,,, | Performed by: PSYCHOLOGIST

## 2024-05-02 NOTE — PROGRESS NOTES
Virtual Visit  The patient location is: home ([place])  The chief complaint leading to consultation is: depression     Visit type: audiovisual     Face to Face time with patient: 30 minutes of total time spent on the encounter, which includes face to face time and non-face to face time preparing to see the patient (eg, review of tests), obtaining and/or reviewing separately obtained history, documenting clinical information in the electronic or other health record, independently interpreting results (not separately reported) and communicating results to the patient/family/caregiver, or care coordination (not separately reported).      Each patient to whom he or she provides medical services by telemedicine is:  (1) informed of the relationship between the physician and patient and the respective role of any other health care provider with respect to management of the patient; and (2) notified that he or she may decline to receive medical services by telemedicine and may withdraw from such care at any time.     Depression Psychoeducation Course   Site: Telemedicine in [place]     Date: 05/02/2024     Course Focus: Introduction and ARC model      Length of service: 30 minutes     Number of participants in attendance: 6     Referred by: No ref. provider found      Target symptoms: Depression     Participant response: Active Listening     Interval History: Discussion of session #1: Introduction to emotions (with an emphasis on depression), ARC Model and CBT basics, and the cycle of depression. Participants were given the homework of tracking emotional triggers, responses, and consequences.      Plan: Continue Depression Psychoeducational Course. Next meeting 2nd Wednesday of the month.      Diagnosis:       ICD-10-CM   1. Encounter for education Z71.9      Plan: Continue treatment     Emilia Guillermo M.S.  Psychology Doctoral Intern  [facility]

## 2024-05-07 ENCOUNTER — OFFICE VISIT (OUTPATIENT)
Dept: PSYCHIATRY | Facility: CLINIC | Age: 24
End: 2024-05-07
Payer: COMMERCIAL

## 2024-05-07 DIAGNOSIS — F33.41 RECURRENT MAJOR DEPRESSIVE DISORDER, IN PARTIAL REMISSION: Primary | ICD-10-CM

## 2024-05-07 PROCEDURE — 90791 PSYCH DIAGNOSTIC EVALUATION: CPT | Mod: S$GLB,,, | Performed by: SOCIAL WORKER

## 2024-05-07 NOTE — PROGRESS NOTES
"Psychiatry Initial Visit (PhD/LCSW)  Diagnostic Interview - CPT 79400    Date: 2024    Site: [facility]    Clinical status of patient: Outpatient    Shabbir Alicea, a 24 y.o. male, for initial evaluation visit.  Met with patient.    Chief complaint/reason for encounter: depression and anxiety    History of present illness:   Endorses depression and suicidal ideations since middle school.  States that kids would tease him about is last name in school and it bothered him. States that he finally got a friend in 6th grade. His mother put hi in online school when he was in 6th grade, he graduated from [employer] and has graduated from [employer] with a double major.   He states that he believes that the Lexapro that he is taking is causing him to sleep excessively.   He states that he felt as though his treatment in the [facility] was beneficial and he has noticed improvement.   Has a history of hitting his head against walls.       Pain: 0    Symptoms:   Mood: depressed mood and diminished interest  Anxiety: excessive anxiety/worry  Substance abuse: use despite negative consquences  Cognitive functioning: denied  Health behaviors: noncontributory    Psychiatric history: has participated in counseling/psychotherapy on an outpatient basis in the past and currently under psychiatric care    Medical history: noncontributory    Family history of psychiatric illness:  Mom- ADHD.     Social history (marriage, employment, etc:    He is an only child.   -Dad  early this year.      B.S. in economics  B.A. in Art  Identifies as bisexual.  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   Currently in a year long relationship with a female who has PTSD.    Substance use:   Alcohol: occasional   Drugs: marijuana- "a hit after 5"    Tobacco: none   Caffeine: none    Current medications and drug reactions (include OTC, herbal): see medication list     Strengths and " liabilities: Strength: Patient accepts guidance/feedback, Strength: Patient is expressive/articulate., Strength: Patient is intelligent., Strength: Patient is motivated for change., Strength: Patient is physically healthy., Strength: Patient has positive support network., Strength: Patient has reasonable judgment., Strength: Patient is stable., Liability: Patient lacks coping skills.    Current Evaluation:     Mental Status Exam:  General Appearance:  unremarkable, age appropriate   Speech: normal tone, normal rate, normal pitch, normal volume      Level of Cooperation: cooperative      Thought Processes: normal and logical   Mood: steady      Thought Content: normal, no suicidality, no homicidality, delusions, or paranoia   Affect: congruent and appropriate   Orientation: Oriented x3   Memory: recent >  intact, remote >  intact   Attention Span & Concentration: intact   Fund of General Knowledge: intact and appropriate to age and level of education   Abstract Reasoning: interpretation of similarities was abstract   Judgment & Insight: fair     Language  intact     Diagnostic Impression - Plan:       ICD-10-CM ICD-9-CM   1. Recurrent major depressive disorder, in partial remission  F33.41 296.35       Plan:individual psychotherapy, group psychotherapy, and medication management by physician    Return to Clinic: 2 weeks    Length of Service (minutes): 60

## 2024-05-08 ENCOUNTER — CLINICAL SUPPORT (OUTPATIENT)
Dept: PSYCHIATRY | Facility: CLINIC | Age: 24
End: 2024-05-08
Payer: COMMERCIAL

## 2024-05-08 DIAGNOSIS — Z71.9 ENCOUNTER FOR EDUCATION: Primary | ICD-10-CM

## 2024-05-08 PROCEDURE — 99499 UNLISTED E&M SERVICE: CPT | Mod: S$GLB,,, | Performed by: PSYCHOLOGIST

## 2024-05-09 NOTE — PROGRESS NOTES
Virtual Visit  The patient location is: home ([place])  The chief complaint leading to consultation is: depression     Visit type: audiovisual     Face to Face time with patient: 45 minutes of total time spent on the encounter, which includes face to face time and non-face to face time preparing to see the patient (eg, review of tests), obtaining and/or reviewing separately obtained history, documenting clinical information in the electronic or other health record, independently interpreting results (not separately reported) and communicating results to the patient/family/caregiver, or care coordination (not separately reported).      Each patient to whom he or she provides medical services by telemedicine is:  (1) informed of the relationship between the physician and patient and the respective role of any other health care provider with respect to management of the patient; and (2) notified that he or she may decline to receive medical services by telemedicine and may withdraw from such care at any time.     Depression Psychoeducation Course   Site: Telemedicine in [place]     Date: 05/08/2024     Course Focus: Cognitive Coping      Length of service: 45 minutes     Number of participants in attendance: 5     Referred by: No ref. provider found     Target symptoms: Depression     Participant response: Active Listening & Self-Disclosure     Interval History: Discussion of session #2: Why are thoughts important, Automatic thoughts, thinking traps, and challenging questions for cognitive flexibility. Participants were given the homework of generating other ways of thinking about emotional situations.      Plan: Continue Depression Psychoeducational Course. Next meeting 3rd Thursday of the month.      Diagnosis:       ICD-10-CM   1. Encounter for education Z71.9      Plan: Continue treatment     Emilia Guillermo M.S.  Psychology Doctoral Intern  [facility]

## 2024-05-16 ENCOUNTER — CLINICAL SUPPORT (OUTPATIENT)
Dept: PSYCHIATRY | Facility: CLINIC | Age: 24
End: 2024-05-16
Payer: COMMERCIAL

## 2024-05-16 DIAGNOSIS — Z71.9 ENCOUNTER FOR EDUCATION: Primary | ICD-10-CM

## 2024-05-16 PROCEDURE — 99499 UNLISTED E&M SERVICE: CPT | Mod: S$GLB,,, | Performed by: PSYCHOLOGIST

## 2024-05-16 NOTE — PROGRESS NOTES
Virtual Visit  The patient location is: home ([place])  The chief complaint leading to consultation is: depression     Visit type: audiovisual     Face to Face time with patient: 55 minutes of total time spent on the encounter, which includes face to face time and non-face to face time preparing to see the patient (eg, review of tests), obtaining and/or reviewing separately obtained history, documenting clinical information in the electronic or other health record, independently interpreting results (not separately reported) and communicating results to the patient/family/caregiver, or care coordination (not separately reported).      Each patient to whom he or she provides medical services by telemedicine is:  (1) informed of the relationship between the physician and patient and the respective role of any other health care provider with respect to management of the patient; and (2) notified that he or she may decline to receive medical services by telemedicine and may withdraw from such care at any time.     Depression Psychoeducation Course   Site: Telemedicine in [place]     Date: 05/16/2024     Course Focus: Behavioral Coping      Length of service: 55 minutes     Number of participants in attendance: 6     Referred by: No ref. provider found     Target symptoms: Depression     Participant response: Active Listening     Interval History: Discussion of session #3: defining emotion driven behaviors, identifying alternative actions, creating SMART goals, and additional habit forming tips.  Why are thoughts important, Automatic thoughts, thinking traps, and challenging questions for cognitive flexibility. Participants were given the homework of practicing alternative actions and setting a smart goal for the week.      Plan: Continue Depression Psychoeducational Course. Next meeting 4th Thursday of the month.      Diagnosis:       ICD-10-CM   1. Encounter for education Z71.9      Plan: Continue treatment      Emilia Guillermo M.S.  Psychology Doctoral Intern  [facility]

## 2024-05-23 ENCOUNTER — CLINICAL SUPPORT (OUTPATIENT)
Dept: PSYCHIATRY | Facility: CLINIC | Age: 24
End: 2024-05-23
Payer: COMMERCIAL

## 2024-05-23 ENCOUNTER — PATIENT MESSAGE (OUTPATIENT)
Dept: PSYCHIATRY | Facility: CLINIC | Age: 24
End: 2024-05-23
Payer: COMMERCIAL

## 2024-05-23 DIAGNOSIS — Z71.9 ENCOUNTER FOR EDUCATION: Primary | ICD-10-CM

## 2024-05-23 PROCEDURE — 99499 UNLISTED E&M SERVICE: CPT | Mod: S$GLB,,, | Performed by: PSYCHOLOGIST

## 2024-05-23 NOTE — PROGRESS NOTES
Virtual Visit  The patient location is: home ([place])  The chief complaint leading to consultation is: depression     Visit type: audiovisual     Face to Face time with patient: 60 minutes of total time spent on the encounter, which includes face to face time and non-face to face time preparing to see the patient (eg, review of tests), obtaining and/or reviewing separately obtained history, documenting clinical information in the electronic or other health record, independently interpreting results (not separately reported) and communicating results to the patient/family/caregiver, or care coordination (not separately reported).      Each patient to whom he or she provides medical services by telemedicine is:  (1) informed of the relationship between the physician and patient and the respective role of any other health care provider with respect to management of the patient; and (2) notified that he or she may decline to receive medical services by telemedicine and may withdraw from such care at any time.     Depression Psychoeducation Course   Site: Telemedicine in [place]     Date: 05/23/2024     Course Focus: Coping with physical sensations      Length of service: 60 minutes     Number of participants in attendance: 6     Referred by: No ref. provider found     Target symptoms: Depression     Participant response: Active Listening     Interval History: Discussion of session #4: Review of physical sensations, stress response and relaxation response, TIP skills, guided imagery, body scan, and group wrap up. Participants were encouraged to continue tracking emotional triggers/responses/ consequences, practicing cognitive coping, behavioral coping, and to incorporate TIP skills and mindfulness activities to cope with physical sensations as they arise.      Plan: Cohort completed Depression Psychoeducational Course.      Diagnosis:       ICD-10-CM   1. Encounter for education Z71.9         Emilia Guillermo,  M.S.  Psychology Doctoral Intern  [facility]

## 2024-06-11 ENCOUNTER — OFFICE VISIT (OUTPATIENT)
Dept: PSYCHIATRY | Facility: CLINIC | Age: 24
End: 2024-06-11
Payer: COMMERCIAL

## 2024-06-11 VITALS — DIASTOLIC BLOOD PRESSURE: 55 MMHG | HEART RATE: 75 BPM | SYSTOLIC BLOOD PRESSURE: 101 MMHG | WEIGHT: 131.5 LBS

## 2024-06-11 DIAGNOSIS — F41.1 GAD (GENERALIZED ANXIETY DISORDER): ICD-10-CM

## 2024-06-11 DIAGNOSIS — F40.10 SOCIAL ANXIETY DISORDER: ICD-10-CM

## 2024-06-11 DIAGNOSIS — F32.A DEPRESSION, UNSPECIFIED DEPRESSION TYPE: Primary | ICD-10-CM

## 2024-06-11 DIAGNOSIS — R41.840 CONCENTRATION DEFICIT: ICD-10-CM

## 2024-06-11 PROCEDURE — 3078F DIAST BP <80 MM HG: CPT | Mod: CPTII,S$GLB,, | Performed by: NURSE PRACTITIONER

## 2024-06-11 PROCEDURE — 99999 PR PBB SHADOW E&M-EST. PATIENT-LVL II: CPT | Mod: PBBFAC,,, | Performed by: NURSE PRACTITIONER

## 2024-06-11 PROCEDURE — 3074F SYST BP LT 130 MM HG: CPT | Mod: CPTII,S$GLB,, | Performed by: NURSE PRACTITIONER

## 2024-06-11 PROCEDURE — 90792 PSYCH DIAG EVAL W/MED SRVCS: CPT | Mod: S$GLB,,, | Performed by: NURSE PRACTITIONER

## 2024-06-11 RX ORDER — FLUOXETINE 10 MG/1
CAPSULE ORAL
Qty: 30 CAPSULE | Refills: 5 | Status: SHIPPED | OUTPATIENT
Start: 2024-06-11

## 2024-06-11 RX ORDER — ESCITALOPRAM OXALATE 5 MG/1
5 TABLET ORAL NIGHTLY
Qty: 7 TABLET | Refills: 0 | Status: SHIPPED | OUTPATIENT
Start: 2024-06-11 | End: 2024-06-18

## 2024-06-11 NOTE — PROGRESS NOTES
"  Outpatient Psychiatry Initial Visit (MD/NP)    6/11/2024    Shabbir Alicea, a 24 y.o. male, presenting for initial evaluation visit. Met with patient.    Reason for Encounter: Referral from Faizan York LCSW . Patient complains of   Chief Complaint   Patient presents with    Depression    Anxiety     Chief compliant in patient's words: "the realization that Lexapro causes excessive sleepiness in me."    BRIEF SOCIAL HISTORY:    Living situation: lives alone in own apartment, girlfriend recently moved out   Relationships: has close group of friends. Father passed away last year.  Academic hx: graduated valedictorian of high school. Bachelor's degree in economics and art of classical studies  Developmental hx: born in [place] and raised in [place]. Raised by both parents. Financial issues when patient was in 6th grade due to father's alcoholism, moved in with grandmother from 7th grade on. Bullied in school regarding last name.   Occupational hx: applied to online tutoring position  Hobbies/activities: video games, paint, architectural design, model making, D&D     HISTORY OF PRESENT ILLNESS:    Depression    Patient reports experience of depressed mood dating back to childhood. Remembers ~ 6th grade experiencing SI, specifically the thought of hanging himself. States that depression has continued into early adulthood. He has difficulty defining his depression, whether it is episodic or persistent. Reports longstanding experience with frequent passive SI, typically in response to feelings of helplessness. Admits that he does not cope well with stressors, and currently has many things going on (financial strain, girlfriend moved out, employment). Endorses persistent depressed mood, hopelessness, and feelings of worthlessness. Denies recent change in appetite, has always had difficulty keeping on weight, "I can't usually read my hunger cues, I kind of forget." Notes that he was not having issues " "with sleep or energy until starting escitalopram, notes excessive sleep and fatigue since starting medication. Denies appreciable change in concentration.     PHQ-9 = 17    Anxiety    "A lot of paralyzing."    Patient reports exacerbation of anxiety in context of current stressors. Does describe himself as a worrier at his baseline. Reports that he "sometimes" finds worry difficult to control. Also endorses social anxiety, fear of being judged by others, typically avoids crowded areas like bars due to this concern. Does feel comfortable in small social settings with close friends. No hx of defined panic attacks. No agoraphobia.    SANGEETHA-7 = 11    Bipolar    The patient denies any history of defined manic episodes including sx of elevated mood, grandiosity, persistent irritability, decreased need for sleep, racing thoughts, excessive goal-directed behavior, flight of ideas, increased energy, or risky/impulsive/erratic behavior in the absence of substance use.     Psychosis     The patient denies any hx of psychotic symptoms including AVH, paranoia, delusions, or thought disorder    OCD     The patient does not present with symptoms consistent with OCD -- absence of intrusive obsessions and accompanying time consuming compulsions.     Trauma, abuse, and violence hx -- denies     Substance use -- nictoine, vaping. Social ETOH use. Frequent THC use, most days    Legal hx -- denies     PSYCHIATRIC HISTORY:    Psychiatric Care (current & past): completed [facility] in 2024 for treatment of depression and anxiety  History of Psychotherapy: no, recently started with Faizan York LCSW  Previous Psychiatric Hospitalizations: no  Previous Suicide Attempts: no  History of Violence: no  Access to firearms: no    Current medications -- escitalopram 10 mg daily     Previous medication trials -- none     Family MH history -- mother (ADHD), father (alcoholism)    MEDICAL HISTORY:     No chronic medical dx. No known allergies " to medications. No regular use of OTC medications or herbal remedies. No hx of seizures. No hx of head injury with LOC. No cardiac hx. No thyroid hx.     PSYCHIATRIC ROS:  Complete psychiatric review of systems performed covering symptoms of depression, anxiety, diane, psychosis, PTSD, OCD, ADHD, and eating disorders performed. Pertinent findings as mentioned in the HPI; otherwise, patient answers are not diagnostic of other disorders and will continued to be assessed in further appointments.      Review Of Systems:     GENERAL:  No weight gain or loss  SKIN:  No rashes or lacerations  HEAD:  No headaches  EYES:  No exophthalmos, jaundice or blindness  EARS:  No dizziness, tinnitus or hearing loss  NOSE:  No changes in smell  MOUTH & THROAT:  No dyskinetic movements or obvious goiter  CHEST:  No shortness of breath, hyperventilation or cough  CARDIOVASCULAR:  No tachycardia or chest pain  ABDOMEN:  No nausea, vomiting, pain, constipation or diarrhea  URINARY:  No frequency, dysuria or sexual dysfunction  ENDOCRINE:  No polydipsia, polyuria  MUSCULOSKELETAL:  No pain or stiffness of the joints  NEUROLOGIC:  No weakness, sensory changes, seizures, confusion, memory loss, tremor or other abnormal movements    Current Evaluation:     Nutritional Screening: Considering the patient's height and weight, medications, medical history and preferences, should a referral be made to the dietitian? no    Constitutional  Vitals:  Most recent vital signs, dated less than 90 days prior to this appointment, were reviewed.    Vitals:    06/11/24 0859   BP: (!) 101/55   Pulse: 75   Weight: 59.6 kg (131 lb 8.1 oz)        General:  age appropriate, thin, long hair in ponytail      Musculoskeletal  Muscle Strength/Tone:  no spasicity, no rigidity, no cogwheeling   Gait & Station:  non-ataxic     Psychiatric  Speech:  no latency; no press   Mood & Affect:  anxious, depressed  anxious   Thought Process:  normal and logical   Associations:   intact   Thought Content:  normal, no suicidality, no homicidality, delusions, or paranoia   Insight:  intact   Judgement: behavior is adequate to circumstances   Orientation:  grossly intact   Memory: intact for content of interview   Language: grossly intact   Attention Span & Concentration:  Distracted at times   Fund of Knowledge:  intact and appropriate to age and level of education       Relevant Elements of Neurological Exam: normal gait    Functioning in Relationships: see above    Laboratory Data  No visits with results within 1 Month(s) from this visit.   Latest known visit with results is:   Lab Visit on 04/16/2024   Component Date Value Ref Range Status    WBC 04/16/2024 6.44  3.90 - 12.70 K/uL Final    RBC 04/16/2024 4.39 (L)  4.60 - 6.20 M/uL Final    Hemoglobin 04/16/2024 13.4 (L)  14.0 - 18.0 g/dL Final    Hematocrit 04/16/2024 40.7  40.0 - 54.0 % Final    MCV 04/16/2024 93  82 - 98 fL Final    MCH 04/16/2024 30.5  27.0 - 31.0 pg Final    MCHC 04/16/2024 32.9  32.0 - 36.0 g/dL Final    RDW 04/16/2024 13.3  11.5 - 14.5 % Final    Platelets 04/16/2024 254  150 - 450 K/uL Final    MPV 04/16/2024 9.9  9.2 - 12.9 fL Final    Immature Granulocytes 04/16/2024 0.2  0.0 - 0.5 % Final    Gran # (ANC) 04/16/2024 3.8  1.8 - 7.7 K/uL Final    Immature Grans (Abs) 04/16/2024 0.01  0.00 - 0.04 K/uL Final    Lymph # 04/16/2024 1.5  1.0 - 4.8 K/uL Final    Mono # 04/16/2024 0.6  0.3 - 1.0 K/uL Final    Eos # 04/16/2024 0.4  0.0 - 0.5 K/uL Final    Baso # 04/16/2024 0.05  0.00 - 0.20 K/uL Final    nRBC 04/16/2024 0  0 /100 WBC Final    Gran % 04/16/2024 59.3  38.0 - 73.0 % Final    Lymph % 04/16/2024 23.9  18.0 - 48.0 % Final    Mono % 04/16/2024 9.0  4.0 - 15.0 % Final    Eosinophil % 04/16/2024 6.8  0.0 - 8.0 % Final    Basophil % 04/16/2024 0.8  0.0 - 1.9 % Final    Differential Method 04/16/2024 Automated   Final    Sodium 04/16/2024 142  136 - 145 mmol/L Final    Potassium 04/16/2024 4.0  3.5 - 5.1 mmol/L Final     Chloride 04/16/2024 103  95 - 110 mmol/L Final    CO2 04/16/2024 31 (H)  23 - 29 mmol/L Final    Glucose 04/16/2024 92  70 - 110 mg/dL Final    BUN 04/16/2024 12  6 - 20 mg/dL Final    Creatinine 04/16/2024 0.8  0.5 - 1.4 mg/dL Final    Calcium 04/16/2024 9.3  8.7 - 10.5 mg/dL Final    Total Protein 04/16/2024 6.9  6.0 - 8.4 g/dL Final    Albumin 04/16/2024 3.9  3.5 - 5.2 g/dL Final    Total Bilirubin 04/16/2024 0.3  0.1 - 1.0 mg/dL Final    Alkaline Phosphatase 04/16/2024 66  55 - 135 U/L Final    AST 04/16/2024 15  10 - 40 U/L Final    ALT 04/16/2024 13  10 - 44 U/L Final    eGFR 04/16/2024 >60.0  >60 mL/min/1.73 m^2 Final    Anion Gap 04/16/2024 8  8 - 16 mmol/L Final    TSH 04/16/2024 0.657  0.400 - 4.000 uIU/mL Final         Medications  Outpatient Encounter Medications as of 6/11/2024   Medication Sig Dispense Refill    EScitalopram oxalate (LEXAPRO) 5 MG Tab Take 1 tablet (5 mg total) by mouth nightly. for 7 days 7 tablet 0    FLUoxetine 10 MG capsule Take 1 capsule daily for 1 week (with 1 tablet of Lexapro), then take 2 capsules daily (stop Lexapro) 30 capsule 5    [DISCONTINUED] EScitalopram oxalate (LEXAPRO) 10 MG tablet Take 1 tablet (10 mg total) by mouth once daily. 30 tablet 0    [DISCONTINUED] EScitalopram oxalate (LEXAPRO) 5 MG Tab Take 1 tablet (5 mg total) by mouth nightly. 30 tablet 0     No facility-administered encounter medications on file as of 6/11/2024.           Assessment - Diagnosis - Goals:     Impression: Shabbir Alicea is a 24 y.o. male with a psychiatric hx of MDD, SANGEETHA, and social anxiety disorder presenting with symptoms consistent with SANGEETHA and social anxiety disorder. R/O persistent depressive disorder. Patient does not meet full criteria for MDD at this time. Medical hx is unremarkable. Family MH hx is significant for ADHD and ETOH abuse in 1st degree relatives. [facility], started on escitalopram at that time. No hx of psychiatric hospitalizations.  No hx of SA, SIB, or violence. Chronic THC use. Lives alone in own apartment. Applying for tutoring positions.         ICD-10-CM ICD-9-CM   1. Depression, unspecified depression type  F32.A 311   2. SANGEETHA (generalized anxiety disorder)  F41.1 300.02   3. Social anxiety disorder  F40.10 300.23       Strengths and Liabilities: Strength: Patient accepts guidance/feedback, Strength: Patient is expressive/articulate., Strength: Patient is intelligent., Strength: Patient is motivated for change., Strength: Patient is physically healthy., Strength: Patient has positive support network., Strength: Patient has reasonable judgment., Liability: Patient lacks coping skills.    Treatment Goals:  Specify outcomes written in observable, behavioral terms:   Anxiety: acquiring relapse prevention skills, reducing negative automatic thoughts, reducing physical symptoms of anxiety, and reducing time spent worrying (<30 minutes/day)  Depression: acquiring relapse prevention skills, increasing energy, increasing interest in usual activities, increasing motivation, reducing excessive guilt, reducing fatigue, and reducing negative automatic thoughts    Treatment Plan/Recommendations:   Reviewed patient's current sx, medication regimen, and psychiatric hx   Patient requests to stop escitalopram due to side effects (fatigue, hypersomnia)  We agreed to cross-taper escitalopram to fluoxetine -> titrate to 20 mg daily  Encouraged patient to continue in therapy  Labs reviewed    Medication Management  Prescription drug management was employed during the encounter, as medications were prescribed, or considered but not prescribed.   [facility] reviewed  The risks and benefits of medication were discussed with the patient.  Possible expectable adverse effects of any current or proposed individual psychotropic agents were discussed with this patient.  Counseling was provided on the importance of full compliance with any prescribed  medication.  Detailed instructions were provided to the patient regarding the administration of any prescribed medication.  Patient voiced understanding    Return to Clinic:  6 weeks    Face-to-face time spent: 45 minutes  55 minutes total time spent. This includes face to face time and non-face to face time preparing to see the patient (eg, review of tests), obtaining and/or reviewing separately obtained history, documenting clinical information in the electronic or other health record, independently interpreting results and communicating results to the patient/family/caregiver, or care coordinator.

## 2024-06-24 ENCOUNTER — CLINICAL SUPPORT (OUTPATIENT)
Dept: PSYCHIATRY | Facility: CLINIC | Age: 24
End: 2024-06-24
Payer: COMMERCIAL

## 2024-06-24 DIAGNOSIS — R41.840 CONCENTRATION DEFICIT: ICD-10-CM

## 2024-06-24 PROCEDURE — 99499 UNLISTED E&M SERVICE: CPT | Mod: S$GLB,,, | Performed by: PSYCHOLOGIST

## 2024-06-24 PROCEDURE — 99999 PR PBB SHADOW E&M-EST. PATIENT-LVL I: CPT | Mod: PBBFAC,,,

## 2024-06-24 NOTE — PROGRESS NOTES
Adult ADHD Clinic Orientation Seminar   Orientation/Psychoeducation    Patient's attendance: Attended in Full     Seminar/Group Focus: ADHD Clinic Orientation Seminar  Target symptoms: ADHD    Site: [facility]  Clinical status of patient: Outpatient  No LOS. Billing Provider and Co-signer: Elisa Dietrich, PhD (see signature below)  Length of Service: 35 minutes    Seminar/Group Topic:  Behavioral Health  Seminar/Group Department: [address]  Seminar/Group Facilitators:  Emilia Guillermo MS; Gretchen Wiggins LMSW  # of patients: 9    Interval history: Patient presented for the Adult ADHD Clinic orientation seminar. The seminar provided information regarding guidelines and structure of assessment, diagnosis, and treatment in this clinic.  Diagnosis:   1. Concentration deficit  Ambulatory referral/consult to Psychology        Patient's response: Accepting  Progress toward goals and other mental status changes: As expected    Plan: Patient will indicate whether to proceed with the Adult ADHD Clinic pre-screen  Return to clinic: as scheduled            Elisa Dietrich, PhD  Clinical Psychologist

## 2024-06-26 ENCOUNTER — PATIENT MESSAGE (OUTPATIENT)
Dept: PSYCHIATRY | Facility: CLINIC | Age: 24
End: 2024-06-26
Payer: COMMERCIAL

## 2024-10-10 ENCOUNTER — OFFICE VISIT (OUTPATIENT)
Dept: PSYCHIATRY | Facility: CLINIC | Age: 24
End: 2024-10-10
Payer: COMMERCIAL

## 2024-10-10 DIAGNOSIS — F32.A DEPRESSION, UNSPECIFIED DEPRESSION TYPE: Primary | ICD-10-CM

## 2024-10-10 DIAGNOSIS — F40.10 SOCIAL ANXIETY DISORDER: ICD-10-CM

## 2024-10-10 DIAGNOSIS — F41.9 ANXIETY: ICD-10-CM

## 2024-10-10 RX ORDER — BUPROPION HYDROCHLORIDE 150 MG/1
TABLET ORAL
Qty: 30 TABLET | Refills: 5 | Status: SHIPPED | OUTPATIENT
Start: 2024-10-10

## 2024-10-10 NOTE — PROGRESS NOTES
Outpatient Psychiatry Follow-Up Visit (MD/NP)    10/10/2024    Clinical Status of Patient:  Outpatient (Ambulatory)    Chief Complaint:  Shabbir Alicea is a 24 y.o. male who presents today for follow-up of depression, anxiety, and attention problems.  Met with patient.      The patient location is: [place]   The chief complaint leading to consultation is: depression/anxiety    Visit type: audiovisual    Face to Face time with patient: 27 minutes  41 minutes of total time spent on the encounter, which includes face to face time and non-face to face time preparing to see the patient (eg, review of tests), Obtaining and/or reviewing separately obtained history, Documenting clinical information in the electronic or other health record, Independently interpreting results (not separately reported) and communicating results to the patient/family/caregiver, or Care coordination (not separately reported).     Each patient to whom he or she provides medical services by telemedicine is:  (1) informed of the relationship between the physician and patient and the respective role of any other health care provider with respect to management of the patient; and (2) notified that he or she may decline to receive medical services by telemedicine and may withdraw from such care at any time.    Notes:       Interval History and Content of Current Session:    Social/medical updates -- moved back home with parents. Girlfriend broke up with patient in late Summer. Grandmother's health has been declining, in nursing home temporarily after fracturing her hip. 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     Mood -- minimal change from last visit. Chronic persistent depressed mood. Also reports anger in context of grandmother's behavior towards his mother, as well as broader anger at current state of the world (climate change denial, other current events). Chronic passive SI. Identifies mother as significant protective factor,  refuses to do that to her.   Anxiety -- largely manageable of late, situational dependent on stressors. No persistent unregulated worry or panic.   Attention -- no significant concerns expressed  Psychosis -- no  Sleep -- poor, early insomnia   Energy -- fair, improved since stopping SSRI  Appetite -- poor/fair, no change from baseline, thinks weight is stable     Substance use -- nicotine, ~ 3 cigarettes daily. Less ETOH and THC use.     Medications:    Fluoxetine 20 mg daily -- not taking due to fatigue     Start: Bupropion  mg daily x1 week, then 300 mg daily    OTC -- planning on restarting melatonin     Previous medication trials -- escitalopram (fatigue), fluoxetine (fatigue)    Screening tools:  10/10/24 -- PHQ-9 = 17  SANGEETHA-7 = 11    Brief synopsis:  Chronic depression, denies active SI/HI/AVH      Review of Systems   PSYCHIATRIC: Pertinant items are noted in the narrative.  CONSTITUTIONAL: See above.   MUSCULOSKELETAL: No pain or stiffness of the joints.  NEUROLOGIC: No weakness, sensory changes, seizures, confusion, memory loss, tremor or other abnormal movements.  GASTROINTESTINAL: No nausea, vomiting, pain, constipation or diarrhea.    Past Medical, Family and Social History: The patient's past medical, family and social history have been reviewed and updated as appropriate within the electronic medical record - see encounter notes.    Living situation: lives alone in own apartment, girlfriend recently moved out   Relationships: has close group of friends. Father passed away last year.  Academic hx: graduated valedictorian of high school. Bachelor's degree in economics and art of classical studies  Developmental hx: born in [place] and raised in [place]. Raised by both parents. Financial issues when patient was in 6th grade due to father's alcoholism, moved in with grandmother from 7th grade on. Bullied in school regarding last name.   Occupational hx: applied to online tutoring  position  Hobbies/activities: video games, paint, architectural design, model making, D&D     Compliance: see above    Side effects: see above    Risk Parameters:  Patient reports no suicidal ideation  Patient reports no homicidal ideation  Patient reports no self-injurious behavior  Patient reports no violent behavior    Exam (detailed: at least 9 elements; comprehensive: all 15 elements)   Constitutional  Vitals:  Most recent vital signs, dated greater than 90 days prior to this appointment, were reviewed.   There were no vitals filed for this visit.     General:  Age appropriate, thin, fair eye contact, mildly guarded     Musculoskeletal  Muscle Strength/Tone:  not examined telemedicine    Gait & Station:  SHAYE  telemedicine      Psychiatric  Speech:  no latency; no press   Mood & Affect:  depressed  full   Thought Process:  normal and logical   Associations:  intact   Thought Content:  suicidal thoughts: (passive-Yes), chronic, denies active SI with plan or intent   Insight:  has awareness of illness   Judgement: behavior is adequate to circumstances   Orientation:  grossly intact   Memory: intact for content of interview   Language: grossly intact   Attention Span & Concentration:  able to focus   Fund of Knowledge:  intact and appropriate to age and level of education     Assessment and Diagnosis   Status/Progress: Based on the examination today, the patient's problem(s) is/are inadequately controlled and failing to respond as expected to treatment.  New problems have been presented today.    Medication side effects  are complicating management of the primary condition.  The working differential for this patient includes see below.     General Impression: Shabbir Alicea is a 24 y.o. male with a psychiatric hx of MDD, SANGEETHA, and social anxiety disorder presenting with symptoms consistent with SANGEETHA and social anxiety disorder. R/O persistent depressive disorder. Patient does not meet full criteria for MDD at  this time. Medical hx is unremarkable. Family MH hx is significant for ADHD and ETOH abuse in 1st degree relatives. Completed [facility] in April 2024, started on escitalopram at that time. No hx of psychiatric hospitalizations. No hx of SA, SIB, or violence. Hx of daily marijuana use. Lives with parents. Currently unemployed.    10/10/24 -- unable to tolerate fluoxetine due to fatigue. Continued persistent depressed mood. Start bupropion XL --> titrate to 300 mg daily.        ICD-10-CM ICD-9-CM   1. Depression, unspecified depression type  F32.A 311   2. Anxiety  F41.9 300.00   3. Social anxiety disorder  F40.10 300.23       Intervention/Counseling/Treatment Plan   Reviewed patient's current symptoms and medication regimen  Medication changes as above  Patient plans on resuming melatonin OTC for sleep  Reviewed sleep hygiene     Medication Management  Prescription drug management was employed during the encounter, as medications were prescribed, or considered but not prescribed.   [facility] reviewed  The risks and benefits of medication were discussed with the patient.  Possible expectable adverse effects of any current or proposed individual psychotropic agents were discussed with this patient.  Counseling was provided on the importance of full compliance with any prescribed medication.  Detailed instructions were provided to the patient regarding the administration of any prescribed medication.  Patient voiced understanding    Return to Clinic: 3 months